# Patient Record
Sex: FEMALE | Race: WHITE | NOT HISPANIC OR LATINO | Employment: UNEMPLOYED | URBAN - METROPOLITAN AREA
[De-identification: names, ages, dates, MRNs, and addresses within clinical notes are randomized per-mention and may not be internally consistent; named-entity substitution may affect disease eponyms.]

---
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
## 2022-07-17 ENCOUNTER — OFFICE VISIT (OUTPATIENT)
Dept: URGENT CARE | Facility: URGENT CARE | Age: 22
End: 2022-07-17
Payer: COMMERCIAL

## 2022-07-17 VITALS
SYSTOLIC BLOOD PRESSURE: 110 MMHG | OXYGEN SATURATION: 99 % | TEMPERATURE: 99.2 F | DIASTOLIC BLOOD PRESSURE: 70 MMHG | RESPIRATION RATE: 16 BRPM | BODY MASS INDEX: 20.16 KG/M2 | WEIGHT: 128.7 LBS | HEART RATE: 118 BPM

## 2022-07-17 DIAGNOSIS — R42 DIZZINESS: Primary | ICD-10-CM

## 2022-07-17 DIAGNOSIS — D72.829 LEUKOCYTOSIS, UNSPECIFIED TYPE: ICD-10-CM

## 2022-07-17 LAB
BASOPHILS # BLD AUTO: 0 10E3/UL (ref 0–0.2)
BASOPHILS NFR BLD AUTO: 0 %
EOSINOPHIL # BLD AUTO: 0 10E3/UL (ref 0–0.7)
EOSINOPHIL NFR BLD AUTO: 0 %
ERYTHROCYTE [DISTWIDTH] IN BLOOD BY AUTOMATED COUNT: 14.3 % (ref 10–15)
HCT VFR BLD AUTO: 41.4 % (ref 35–47)
HGB BLD-MCNC: 13.3 G/DL (ref 11.7–15.7)
LYMPHOCYTES # BLD AUTO: 1.2 10E3/UL (ref 0.8–5.3)
LYMPHOCYTES NFR BLD AUTO: 7 %
MCH RBC QN AUTO: 28.7 PG (ref 26.5–33)
MCHC RBC AUTO-ENTMCNC: 32.1 G/DL (ref 31.5–36.5)
MCV RBC AUTO: 89 FL (ref 78–100)
MONOCYTES # BLD AUTO: 1.1 10E3/UL (ref 0–1.3)
MONOCYTES NFR BLD AUTO: 6 %
NEUTROPHILS # BLD AUTO: 15 10E3/UL (ref 1.6–8.3)
NEUTROPHILS NFR BLD AUTO: 87 %
PLATELET # BLD AUTO: 251 10E3/UL (ref 150–450)
RBC # BLD AUTO: 4.63 10E6/UL (ref 3.8–5.2)
WBC # BLD AUTO: 17.3 10E3/UL (ref 4–11)

## 2022-07-17 PROCEDURE — 80050 GENERAL HEALTH PANEL: CPT | Performed by: FAMILY MEDICINE

## 2022-07-17 PROCEDURE — 93000 ELECTROCARDIOGRAM COMPLETE: CPT | Performed by: FAMILY MEDICINE

## 2022-07-17 PROCEDURE — 36415 COLL VENOUS BLD VENIPUNCTURE: CPT | Performed by: FAMILY MEDICINE

## 2022-07-17 PROCEDURE — 99203 OFFICE O/P NEW LOW 30 MIN: CPT | Performed by: FAMILY MEDICINE

## 2022-07-17 NOTE — PROGRESS NOTES
SUBJECTIVE:  Chief Complaint   Patient presents with     Consult     Spells of feeling like fainting last 2 weeks      Mali Schumacher is a 22 year old female who presents with a chief complaint of fainting X 2 weeks.    May have been present for the past 8 months, will last 1-2 hours where she get lightheaded.  Will try to drink water, has to slowed down and sit down, try to take deep breaths, will try to lay down if at home.  This will occur 1-2 times a week.  Thinks that passed once once.    Here today as over the past 2-3 weeks has been getting worse, occurring daily.  No diarrhea.  Endorsed nausea, will occur more with dizziness.  No recent URI symptoms or fever.    Works at [employer], walks all day and gets sweaty, used to work 40 hours but this is on weekends now.  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    On OCP, this is a new one since January but does not think this is the cause.    Past Medical History:   Diagnosis Date     Anxiety      Depressive disorder      NO ACTIVE PROBLEMS      Substance abuse (H)      Current Outpatient Medications   Medication Sig Dispense Refill     potassium chloride ER (K-TAB) 20 MEQ CR tablet Take 1 tablet (20 mEq) by mouth daily 5 tablet 0     Social History     Tobacco Use     Smoking status: Current Every Day Smoker     Smokeless tobacco: Never Used     Tobacco comment: pt reports using nicotine vape throughout day daily   Substance Use Topics     Alcohol use: Yes     Alcohol/week: 0.0 standard drinks     Comment: reports drinking heavily nightly for the last two weeks, prior only on the weekends       ROS:  Review of systems negative except as stated above.    EXAM:   /70 (BP Location: Right arm, Patient Position: Chair, Cuff Size: Adult Regular)   Pulse 118   Temp 99.2  F (37.3  C) (Oral)   Resp 16   Wt 58.4 kg (128 lb 11.2 oz)   SpO2 99%   Breastfeeding No   BMI 20.16 kg/m     GENERAL APPEARANCE: healthy, alert and no distress  CHEST: clear to auscultation  CV: regular rate and rhythm  EXTREMITIES: peripheral pulses normal  PSYCH:alert, affect bright    EKG - NSR, rate 90, no PVCs, no ST c/w ischemia    Results for orders placed or performed in visit on 07/17/22   CBC with platelets and differential     Status: Abnormal   Result Value Ref Range    WBC Count 17.3 (H) 4.0 - 11.0 10e3/uL    RBC Count 4.63 3.80 - 5.20 10e6/uL    Hemoglobin 13.3 11.7 - 15.7 g/dL    Hematocrit 41.4 35.0 - 47.0 %    MCV 89 78 - 100 fL    MCH 28.7 26.5 - 33.0 pg    MCHC 32.1 31.5 - 36.5 g/dL    RDW 14.3 10.0 - 15.0 %    Platelet Count 251 150 - 450 10e3/uL    % Neutrophils 87 %    % Lymphocytes 7 %    % Monocytes 6 %    % Eosinophils 0 %    % Basophils 0 %    Absolute Neutrophils 15.0 (H) 1.6 - 8.3 10e3/uL    Absolute Lymphocytes 1.2 0.8 - 5.3 10e3/uL    Absolute Monocytes 1.1 0.0 - 1.3 10e3/uL    Absolute Eosinophils 0.0 0.0 - 0.7 10e3/uL    Absolute Basophils 0.0 0.0 - 0.2 10e3/uL   CBC with platelets and differential     Status: Abnormal    Narrative    The following orders were created for panel order CBC with platelets and differential.  Procedure                               Abnormality         Status                     ---------                               -----------         ------                     CBC with platelets and d...[[id number]]  Abnormal            Final result                 Please view results for these tests on the individual orders.       ASSESSMENT/PLAN:  (R42) Dizziness  (primary encounter diagnosis)  Comment: prolong symptoms  Plan: CBC with platelets and differential,         Comprehensive metabolic panel (BMP + Alb, Alk         Phos, ALT, AST, Total. Bili, TP), EKG 12-lead         complete w/read - Clinics, TSH with free T4         reflex            (D72.829) Leukocytosis, unspecified type  Plan: follow up with primary provider      Reviewed that further evaluation and work up  for her dizziness/lightheaded symptoms will need to be thru primary provider.  Encourage rest, plenty of fluids.  Reassurance given that EKG is normal though may need to obtain heart monitor to see if any correlation with symptoms.  Notable elevated WBC count, does not appear to have any easily identifable infectious source, will have patient follow up with primary provider for recheck.    Work excuse note given  Follow up with primary provider within 1 week    Hilton Malin MD  July 17, 2022 3:07 PM

## 2022-07-17 NOTE — LETTER
July 17, 2022      Mali Schumacher  [address] [address] [address]        To Whom It May Concern:    Mali Schumacher  was seen on 7/17.  Please excuse her from work 7/18-7/19 due to illness.        Sincerely,        Hilton Malin MD

## 2022-07-18 LAB
ALBUMIN SERPL-MCNC: 4.6 G/DL (ref 3.4–5)
ALP SERPL-CCNC: 80 U/L (ref 40–150)
ALT SERPL W P-5'-P-CCNC: 64 U/L (ref 0–50)
ANION GAP SERPL CALCULATED.3IONS-SCNC: 9 MMOL/L (ref 3–14)
AST SERPL W P-5'-P-CCNC: 46 U/L (ref 0–45)
BILIRUB SERPL-MCNC: 1 MG/DL (ref 0.2–1.3)
BUN SERPL-MCNC: 8 MG/DL (ref 7–30)
CALCIUM SERPL-MCNC: 9.5 MG/DL (ref 8.5–10.1)
CHLORIDE BLD-SCNC: 102 MMOL/L (ref 94–109)
CO2 SERPL-SCNC: 23 MMOL/L (ref 20–32)
CREAT SERPL-MCNC: 0.86 MG/DL (ref 0.52–1.04)
GFR SERPL CREATININE-BSD FRML MDRD: >90 ML/MIN/1.73M2
GLUCOSE BLD-MCNC: 100 MG/DL (ref 70–99)
POTASSIUM BLD-SCNC: 4 MMOL/L (ref 3.4–5.3)
PROT SERPL-MCNC: 8 G/DL (ref 6.8–8.8)
SODIUM SERPL-SCNC: 134 MMOL/L (ref 133–144)
TSH SERPL DL<=0.005 MIU/L-ACNC: 0.98 MU/L (ref 0.4–4)

## 2022-08-05 ENCOUNTER — TELEPHONE (OUTPATIENT)
Dept: PSYCHIATRY | Facility: CLINIC | Age: 22
End: 2022-08-05

## 2022-09-19 ENCOUNTER — OFFICE VISIT (OUTPATIENT)
Dept: URGENT CARE | Facility: URGENT CARE | Age: 22
End: 2022-09-19
Payer: COMMERCIAL

## 2022-09-19 VITALS
DIASTOLIC BLOOD PRESSURE: 72 MMHG | WEIGHT: 128 LBS | RESPIRATION RATE: 16 BRPM | TEMPERATURE: 98.4 F | OXYGEN SATURATION: 97 % | HEART RATE: 98 BPM | BODY MASS INDEX: 20.05 KG/M2 | SYSTOLIC BLOOD PRESSURE: 120 MMHG

## 2022-09-19 DIAGNOSIS — J03.90 ACUTE TONSILLITIS, UNSPECIFIED ETIOLOGY: Primary | ICD-10-CM

## 2022-09-19 LAB — DEPRECATED S PYO AG THROAT QL EIA: NEGATIVE

## 2022-09-19 PROCEDURE — 99213 OFFICE O/P EST LOW 20 MIN: CPT | Performed by: PHYSICIAN ASSISTANT

## 2022-09-19 PROCEDURE — 87651 STREP A DNA AMP PROBE: CPT | Performed by: PHYSICIAN ASSISTANT

## 2022-09-19 NOTE — PROGRESS NOTES
URGENT CARE VISIT:    SUBJECTIVE:   Mali Schumacher is a 22 year old female presenting with a chief complaint of sore throat and fatigue.  Onset was today.  She denies the following symptoms: stuffy nose, cough - non-productive, vomiting and diarrhea  Course of illness is same.    Treatment measures tried include Tylenol/Ibuprofen with no relief of symptoms.  Predisposing factors include None.    PMH:   Past Medical History:   Diagnosis Date     Anxiety      Depressive disorder      NO ACTIVE PROBLEMS      Substance abuse (H)      Allergies: No known allergies   Medications:   Current Outpatient Medications   Medication Sig Dispense Refill     amoxicillin-clavulanate (AUGMENTIN) 875-125 MG tablet Take 1 tablet by mouth 2 times daily for 7 days 14 tablet 0     potassium chloride ER (K-TAB) 20 MEQ CR tablet Take 1 tablet (20 mEq) by mouth daily 5 tablet 0     Social History:   Social History     Tobacco Use     Smoking status: Current Every Day Smoker     Smokeless tobacco: Never Used     Tobacco comment: pt reports using nicotine vape throughout day daily   Substance Use Topics     Alcohol use: Yes     Alcohol/week: 0.0 standard drinks     Comment: reports drinking heavily nightly for the last two weeks, prior only on the weekends       ROS:  Review of systems negative except as stated above.    OBJECTIVE:  /72   Pulse 98   Temp 98.4  F (36.9  C) (Oral)   Resp 16   Wt 58.1 kg (128 lb)   SpO2 97%   BMI 20.05 kg/m    GENERAL APPEARANCE: healthy, alert and no distress  EYES: EOMI,  PERRL, conjunctiva clear  HENT: ear canals and TM's normal.  Right tonsil 2+ with exudates and erythema. Normal left tonsil. NECK: supple, nontender, no lymphadenopathy  RESP: lungs clear to auscultation - no rales, rhonchi or wheezes  CV: regular rates and rhythm, normal S1 S2, no murmur noted  SKIN: no suspicious lesions or rashes    Labs:    Results for orders placed or performed in visit on 09/19/22   Streptococcus A Rapid  Screen w/Reflex to PCR - Clinic Collect     Status: Normal    Specimen: Throat; Swab   Result Value Ref Range    Group A Strep antigen Negative Negative       ASSESSMENT:    ICD-10-CM    1. Acute tonsillitis, unspecified etiology  J03.90 Streptococcus A Rapid Screen w/Reflex to PCR - Clinic Collect     Group A Streptococcus PCR Throat Swab     amoxicillin-clavulanate (AUGMENTIN) 875-125 MG tablet       PLAN:  Patient Instructions   Patient was educated on the natural course of bacterial tonsillitis. Left tonsil is markedly bigger than right. She has no uvula deviation or difficulty swallowing. Will treat with Augmentin.Take medications as prescribed. Side effects discussed. Conservative measures discussed including warm fluids, salt water gargles, Lozenges (Cepacol), and over-the-counter analgesics (Tylenol or Ibuprofen). To prevent spread avoid sharing utensils or glasses until she has completed 24 hours of antibiotic treatment.  See your primary care provider if symptoms worsen or do not improve in 5 days. Seek emergency care if you develop severe throat pain, or difficulty swallowing.    Patient verbalized understanding and is agreeable to plan. The patient was discharged ambulatory and in stable condition.    Jennifer Conklin PA-C ....................  9/19/2022   7:31 PM

## 2022-09-20 LAB — GROUP A STREP BY PCR: NOT DETECTED

## 2022-09-20 NOTE — PATIENT INSTRUCTIONS
Patient was educated on the natural course of bacterial tonsillitis. Take medications as prescribed. Side effects discussed. Conservative measures discussed including warm fluids, salt water gargles, Lozenges (Cepacol), and over-the-counter analgesics (Tylenol or Ibuprofen). To prevent spread avoid sharing utensils or glasses until she has completed 24 hours of antibiotic treatment.  See your primary care provider if symptoms worsen or do not improve in 5 days. Seek emergency care if you develop severe throat pain, or difficulty swallowing.

## 2022-12-07 ENCOUNTER — OFFICE VISIT (OUTPATIENT)
Dept: URGENT CARE | Facility: URGENT CARE | Age: 22
End: 2022-12-07
Payer: COMMERCIAL

## 2022-12-07 VITALS
DIASTOLIC BLOOD PRESSURE: 76 MMHG | RESPIRATION RATE: 18 BRPM | TEMPERATURE: 97.8 F | SYSTOLIC BLOOD PRESSURE: 114 MMHG | HEART RATE: 120 BPM | OXYGEN SATURATION: 99 %

## 2022-12-07 DIAGNOSIS — R11.0 NAUSEA: ICD-10-CM

## 2022-12-07 DIAGNOSIS — R05.1 ACUTE COUGH: ICD-10-CM

## 2022-12-07 DIAGNOSIS — R52 BODY ACHES: Primary | ICD-10-CM

## 2022-12-07 LAB
FLUAV AG SPEC QL IA: NEGATIVE
FLUBV AG SPEC QL IA: NEGATIVE

## 2022-12-07 PROCEDURE — 87804 INFLUENZA ASSAY W/OPTIC: CPT | Performed by: FAMILY MEDICINE

## 2022-12-07 PROCEDURE — 99213 OFFICE O/P EST LOW 20 MIN: CPT | Performed by: FAMILY MEDICINE

## 2022-12-07 RX ORDER — BENZONATATE 100 MG/1
100 CAPSULE ORAL 3 TIMES DAILY PRN
Qty: 30 CAPSULE | Refills: 0 | Status: SHIPPED | OUTPATIENT
Start: 2022-12-07 | End: 2022-12-12

## 2022-12-07 RX ORDER — ONDANSETRON 4 MG/1
4-8 TABLET, ORALLY DISINTEGRATING ORAL EVERY 8 HOURS PRN
Qty: 10 TABLET | Refills: 0 | Status: SHIPPED | OUTPATIENT
Start: 2022-12-07 | End: 2022-12-10

## 2022-12-07 NOTE — LETTER
[facility]  [address] [address]  [phone]      December 7, 2022    RE:  Mali Schumacher                                                                                                                                  To whom it may concern:    Mali Schumacher was seen in our urgent care today.  Please excuse her absence from work on Wednesday, December 7 and Thursday, December 8.      Sincerely,        Jo-Ann Maldonado MD    [facility]

## 2022-12-07 NOTE — PROGRESS NOTES
ICD-10-CM    1. Body aches  R52 Influenza A/B antigen      2. Nausea  R11.0 ondansetron (ZOFRAN ODT) 4 MG ODT tab      3. Acute cough  R05.1 benzonatate (TESSALON) 100 MG capsule        Influenza rapid testing negative today.  Likely viral etiology.      PLAN:  Patient Instructions   Use Zofran 1-2 tablets every 8 hours as needed for nausea.    Use benzonatate 1-2 pills every 8 hours as needed for coughing.    Lots of fluids and lots of reset.    If not back to baseline in 2 weeks, see your primary care doctor.    SUBJECTIVE:  Mali Schumacher is a 22 year old female who presents to  today with body aches, nausea and cough.  Has been sick on and off or about 2 months now.  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  Worst symptom today is nausea.  Has been fatigued as well.  Lots of sick kids at work.    OBJECTIVE:  /76 (BP Location: Right arm, Patient Position: Sitting, Cuff Size: Adult Regular)   Pulse 120   Temp 97.8  F (36.6  C) (Tympanic)   Resp 18   SpO2 99%   GEN: appears tired, in NAD  EYES: mild bilateral conjunctival injection  ENT: TMs normal, oral MMM, normal pharynx  Neck: no LAD  Lungs:  CTAB  CV:  Tachycardia resolved by the time of my exam, RRR    Results for orders placed or performed in visit on 12/07/22   Influenza A/B antigen     Status: Normal    Specimen: Nose; Swab   Result Value Ref Range    Influenza A antigen Negative Negative    Influenza B antigen Negative Negative    Narrative    Test results must be correlated with clinical data. If necessary, results should be confirmed by a molecular assay or viral culture.

## 2022-12-07 NOTE — PATIENT INSTRUCTIONS
Use Zofran 1-2 tablets every 8 hours as needed for nausea.    Use benzonatate 1-2 pills every 8 hours as needed for coughing.    Lots of fluids and lots of reset.    If not back to baseline in 2 weeks, see your primary care doctor.

## 2023-01-17 ENCOUNTER — TELEPHONE (OUTPATIENT)
Dept: BEHAVIORAL HEALTH | Facility: CLINIC | Age: 23
End: 2023-01-17

## 2023-01-17 ENCOUNTER — OFFICE VISIT (OUTPATIENT)
Dept: URGENT CARE | Facility: URGENT CARE | Age: 23
End: 2023-01-17
Payer: COMMERCIAL

## 2023-01-17 ENCOUNTER — HOSPITAL ENCOUNTER (EMERGENCY)
Facility: CLINIC | Age: 23
Discharge: OTHER INSTITUTION | End: 2023-01-18
Attending: EMERGENCY MEDICINE | Admitting: EMERGENCY MEDICINE
Payer: COMMERCIAL

## 2023-01-17 ENCOUNTER — HOSPITAL ENCOUNTER (EMERGENCY)
Facility: CLINIC | Age: 23
Discharge: HOME | End: 2023-01-17
Payer: COMMERCIAL

## 2023-01-17 VITALS — TEMPERATURE: 97.7 F | OXYGEN SATURATION: 100 % | HEART RATE: 121 BPM | RESPIRATION RATE: 22 BRPM

## 2023-01-17 DIAGNOSIS — R45.851 SUICIDAL IDEATION: ICD-10-CM

## 2023-01-17 DIAGNOSIS — F32.A DEPRESSION, UNSPECIFIED DEPRESSION TYPE: Primary | ICD-10-CM

## 2023-01-17 DIAGNOSIS — F41.9 ANXIETY: ICD-10-CM

## 2023-01-17 DIAGNOSIS — R45.86 EMOTIONAL LABILITY: ICD-10-CM

## 2023-01-17 PROBLEM — Z86.19 HISTORY OF COLD SORES: Status: ACTIVE | Noted: 2020-01-06

## 2023-01-17 PROBLEM — R46.89 COGNITIVE AND BEHAVIORAL CHANGES: Status: ACTIVE | Noted: 2019-03-18

## 2023-01-17 PROBLEM — F90.2 ADHD (ATTENTION DEFICIT HYPERACTIVITY DISORDER), COMBINED TYPE: Status: ACTIVE | Noted: 2020-01-06

## 2023-01-17 PROBLEM — R41.89 COGNITIVE AND BEHAVIORAL CHANGES: Status: ACTIVE | Noted: 2019-03-18

## 2023-01-17 PROBLEM — R63.0 ANOREXIA: Status: ACTIVE | Noted: 2018-05-16

## 2023-01-17 PROBLEM — F41.8 MIXED ANXIETY DEPRESSIVE DISORDER: Status: ACTIVE | Noted: 2017-05-11

## 2023-01-17 LAB
ALBUMIN SERPL BCG-MCNC: 4.8 G/DL (ref 3.5–5.2)
ALBUMIN UR-MCNC: 100 MG/DL
ALP SERPL-CCNC: 88 U/L (ref 35–104)
ALT SERPL W P-5'-P-CCNC: 24 U/L (ref 10–35)
AMPHETAMINES UR QL SCN: ABNORMAL
ANION GAP SERPL CALCULATED.3IONS-SCNC: 16 MMOL/L (ref 7–15)
APPEARANCE UR: CLEAR
AST SERPL W P-5'-P-CCNC: 25 U/L (ref 10–35)
BACTERIA #/AREA URNS HPF: ABNORMAL /HPF
BARBITURATES UR QL SCN: ABNORMAL
BASOPHILS # BLD AUTO: 0 10E3/UL (ref 0–0.2)
BASOPHILS NFR BLD AUTO: 0 %
BENZODIAZ UR QL SCN: ABNORMAL
BILIRUB SERPL-MCNC: 1.6 MG/DL
BILIRUB UR QL STRIP: NEGATIVE
BUN SERPL-MCNC: 6.7 MG/DL (ref 6–20)
BZE UR QL SCN: ABNORMAL
CALCIUM SERPL-MCNC: 9.6 MG/DL (ref 8.6–10)
CANNABINOIDS UR QL SCN: ABNORMAL
CHLORIDE SERPL-SCNC: 105 MMOL/L (ref 98–107)
COLOR UR AUTO: YELLOW
CREAT SERPL-MCNC: 0.82 MG/DL (ref 0.51–0.95)
DEPRECATED HCO3 PLAS-SCNC: 19 MMOL/L (ref 22–29)
EOSINOPHIL # BLD AUTO: 0 10E3/UL (ref 0–0.7)
EOSINOPHIL NFR BLD AUTO: 0 %
ERYTHROCYTE [DISTWIDTH] IN BLOOD BY AUTOMATED COUNT: 13.1 % (ref 10–15)
ETHANOL SERPL-MCNC: <0.01 G/DL
GFR SERPL CREATININE-BSD FRML MDRD: >90 ML/MIN/1.73M2
GLUCOSE SERPL-MCNC: 119 MG/DL (ref 70–99)
GLUCOSE UR STRIP-MCNC: NEGATIVE MG/DL
HCG UR QL: NEGATIVE
HCT VFR BLD AUTO: 39.1 % (ref 35–47)
HGB BLD-MCNC: 13.1 G/DL (ref 11.7–15.7)
HGB UR QL STRIP: NEGATIVE
IMM GRANULOCYTES # BLD: 0 10E3/UL
IMM GRANULOCYTES NFR BLD: 0 %
KETONES UR STRIP-MCNC: NEGATIVE MG/DL
LEUKOCYTE ESTERASE UR QL STRIP: NEGATIVE
LYMPHOCYTES # BLD AUTO: 1.8 10E3/UL (ref 0.8–5.3)
LYMPHOCYTES NFR BLD AUTO: 20 %
MCH RBC QN AUTO: 30.5 PG (ref 26.5–33)
MCHC RBC AUTO-ENTMCNC: 33.5 G/DL (ref 31.5–36.5)
MCV RBC AUTO: 91 FL (ref 78–100)
MONOCYTES # BLD AUTO: 0.4 10E3/UL (ref 0–1.3)
MONOCYTES NFR BLD AUTO: 5 %
MUCOUS THREADS #/AREA URNS LPF: PRESENT /LPF
NEUTROPHILS # BLD AUTO: 6.6 10E3/UL (ref 1.6–8.3)
NEUTROPHILS NFR BLD AUTO: 75 %
NITRATE UR QL: NEGATIVE
NRBC # BLD AUTO: 0 10E3/UL
NRBC BLD AUTO-RTO: 0 /100
OPIATES UR QL SCN: ABNORMAL
PH UR STRIP: 7.5 [PH] (ref 5–7)
PLATELET # BLD AUTO: 276 10E3/UL (ref 150–450)
POTASSIUM SERPL-SCNC: 3.5 MMOL/L (ref 3.4–5.3)
PROT SERPL-MCNC: 7.6 G/DL (ref 6.4–8.3)
RBC # BLD AUTO: 4.29 10E6/UL (ref 3.8–5.2)
RBC URINE: 1 /HPF
SODIUM SERPL-SCNC: 140 MMOL/L (ref 136–145)
SP GR UR STRIP: 1.02 (ref 1–1.03)
SQUAMOUS EPITHELIAL: 9 /HPF
UROBILINOGEN UR STRIP-MCNC: NORMAL MG/DL
WBC # BLD AUTO: 8.9 10E3/UL (ref 4–11)
WBC URINE: 5 /HPF

## 2023-01-17 PROCEDURE — 250N000013 HC RX MED GY IP 250 OP 250 PS 637: Performed by: EMERGENCY MEDICINE

## 2023-01-17 PROCEDURE — 81025 URINE PREGNANCY TEST: CPT | Performed by: EMERGENCY MEDICINE

## 2023-01-17 PROCEDURE — 36415 COLL VENOUS BLD VENIPUNCTURE: CPT | Performed by: EMERGENCY MEDICINE

## 2023-01-17 PROCEDURE — 258N000003 HC RX IP 258 OP 636: Performed by: EMERGENCY MEDICINE

## 2023-01-17 PROCEDURE — 82077 ASSAY SPEC XCP UR&BREATH IA: CPT | Performed by: EMERGENCY MEDICINE

## 2023-01-17 PROCEDURE — 99285 EMERGENCY DEPT VISIT HI MDM: CPT | Mod: 25

## 2023-01-17 PROCEDURE — 80307 DRUG TEST PRSMV CHEM ANLYZR: CPT | Performed by: EMERGENCY MEDICINE

## 2023-01-17 PROCEDURE — 90791 PSYCH DIAGNOSTIC EVALUATION: CPT

## 2023-01-17 PROCEDURE — C9803 HOPD COVID-19 SPEC COLLECT: HCPCS

## 2023-01-17 PROCEDURE — 81001 URINALYSIS AUTO W/SCOPE: CPT | Performed by: EMERGENCY MEDICINE

## 2023-01-17 PROCEDURE — 80053 COMPREHEN METABOLIC PANEL: CPT | Performed by: EMERGENCY MEDICINE

## 2023-01-17 PROCEDURE — 99215 OFFICE O/P EST HI 40 MIN: CPT | Performed by: PHYSICIAN ASSISTANT

## 2023-01-17 PROCEDURE — 85004 AUTOMATED DIFF WBC COUNT: CPT | Performed by: EMERGENCY MEDICINE

## 2023-01-17 RX ORDER — AZELAIC ACID 0.15 G/G
GEL TOPICAL
COMMUNITY
Start: 2022-10-20

## 2023-01-17 RX ORDER — SPIRONOLACTONE 50 MG/1
1 TABLET, FILM COATED ORAL
COMMUNITY
Start: 2022-10-14

## 2023-01-17 RX ORDER — HYDROXYZINE HYDROCHLORIDE 25 MG/1
TABLET, FILM COATED ORAL
COMMUNITY
Start: 2021-04-29 | End: 2023-01-17

## 2023-01-17 RX ORDER — LORAZEPAM 1 MG/1
1 TABLET ORAL ONCE
Status: COMPLETED | OUTPATIENT
Start: 2023-01-17 | End: 2023-01-17

## 2023-01-17 RX ORDER — ARIPIPRAZOLE 5 MG/1
5 TABLET ORAL EVERY MORNING
COMMUNITY
Start: 2022-09-23 | End: 2023-01-17

## 2023-01-17 RX ORDER — LAMOTRIGINE 200 MG/1
1 TABLET ORAL
COMMUNITY
Start: 2022-10-06

## 2023-01-17 RX ORDER — SODIUM CHLORIDE 9 MG/ML
INJECTION, SOLUTION INTRAVENOUS CONTINUOUS
Status: DISCONTINUED | OUTPATIENT
Start: 2023-01-17 | End: 2023-01-18 | Stop reason: HOSPADM

## 2023-01-17 RX ORDER — DEXTROAMPHETAMINE SACCHARATE, AMPHETAMINE ASPARTATE MONOHYDRATE, DEXTROAMPHETAMINE SULFATE AND AMPHETAMINE SULFATE 7.5; 7.5; 7.5; 7.5 MG/1; MG/1; MG/1; MG/1
1 CAPSULE, EXTENDED RELEASE ORAL DAILY
COMMUNITY
Start: 2021-07-27 | End: 2023-01-17

## 2023-01-17 RX ORDER — OLANZAPINE 10 MG/1
10 TABLET ORAL
Status: ON HOLD | COMMUNITY
Start: 2022-10-06 | End: 2023-01-23

## 2023-01-17 RX ORDER — PROPRANOLOL HYDROCHLORIDE 20 MG/1
20 TABLET ORAL 2 TIMES DAILY PRN
COMMUNITY
Start: 2022-10-06

## 2023-01-17 RX ORDER — METOCLOPRAMIDE 5 MG/1
5 TABLET ORAL 2 TIMES DAILY PRN
COMMUNITY
Start: 2022-12-11

## 2023-01-17 RX ORDER — QUETIAPINE FUMARATE 25 MG/1
1 TABLET, FILM COATED ORAL AT BEDTIME
COMMUNITY
Start: 2022-10-06

## 2023-01-17 RX ORDER — FLUOXETINE 10 MG/1
CAPSULE ORAL
COMMUNITY
Start: 2022-04-20 | End: 2023-01-17

## 2023-01-17 RX ORDER — LORAZEPAM 0.5 MG/1
TABLET ORAL
COMMUNITY
Start: 2022-11-21

## 2023-01-17 RX ORDER — DEXTROAMPHETAMINE SACCHARATE, AMPHETAMINE ASPARTATE, DEXTROAMPHETAMINE SULFATE AND AMPHETAMINE SULFATE 5; 5; 5; 5 MG/1; MG/1; MG/1; MG/1
20 TABLET ORAL 2 TIMES DAILY
COMMUNITY

## 2023-01-17 RX ORDER — MECLIZINE HCL 12.5 MG 12.5 MG/1
12.5 TABLET ORAL 3 TIMES DAILY PRN
COMMUNITY
Start: 2022-07-19

## 2023-01-17 RX ADMIN — LORAZEPAM 1 MG: 1 TABLET ORAL at 18:00

## 2023-01-17 RX ADMIN — SODIUM CHLORIDE 1000 ML: 9 INJECTION, SOLUTION INTRAVENOUS at 18:00

## 2023-01-17 ASSESSMENT — COLUMBIA-SUICIDE SEVERITY RATING SCALE - C-SSRS
6. HAVE YOU EVER DONE ANYTHING, STARTED TO DO ANYTHING, OR PREPARED TO DO ANYTHING TO END YOUR LIFE?: NO
3. HAVE YOU BEEN THINKING ABOUT HOW YOU MIGHT KILL YOURSELF?: YES
ATTEMPT LIFETIME: NO
2. HAVE YOU ACTUALLY HAD ANY THOUGHTS OF KILLING YOURSELF?: YES
REASONS FOR IDEATION LIFETIME: MOSTLY TO END OR STOP THE PAIN (YOU COULDN'T GO ON LIVING WITH THE PAIN OR HOW YOU WERE FEELING)
REASONS FOR IDEATION PAST MONTH: MOSTLY TO END OR STOP THE PAIN (YOU COULDN'T GO ON LIVING WITH THE PAIN OR HOW YOU WERE FEELING)
4. HAVE YOU HAD THESE THOUGHTS AND HAD SOME INTENTION OF ACTING ON THEM?: YES
5. HAVE YOU STARTED TO WORK OUT OR WORKED OUT THE DETAILS OF HOW TO KILL YOURSELF? DO YOU INTEND TO CARRY OUT THIS PLAN?: NO
2. HAVE YOU ACTUALLY HAD ANY THOUGHTS OF KILLING YOURSELF?: NO PLAN, NO INTENT
2. HAVE YOU ACTUALLY HAD ANY THOUGHTS OF KILLING YOURSELF?: NO PLAN, NO INTENT
1. IN THE PAST MONTH, HAVE YOU WISHED YOU WERE DEAD OR WISHED YOU COULD GO TO SLEEP AND NOT WAKE UP?: YES
1. IN YOUR LIFETIME, HAVE YOU WISHED YOU WERE DEAD OR WISHED YOU COULD GO TO SLEEP AND NOT WAKE UP?: PASSIVE SI
2. HAVE YOU ACTUALLY HAD ANY THOUGHTS OF KILLING YOURSELF?: YES
TOTAL  NUMBER OF INTERRUPTED ATTEMPTS LIFETIME: NO
1. HAVE YOU WISHED YOU WERE DEAD OR WISHED YOU COULD GO TO SLEEP AND NOT WAKE UP?: YES
TOTAL  NUMBER OF ABORTED OR SELF INTERRUPTED ATTEMPTS LIFETIME: NO
1. IN THE PAST MONTH, HAVE YOU WISHED YOU WERE DEAD OR WISHED YOU COULD GO TO SLEEP AND NOT WAKE UP?: PASSIVE SI
5. HAVE YOU STARTED TO WORK OUT OR WORKED OUT THE DETAILS OF HOW TO KILL YOURSELF? DO YOU INTEND TO CARRY OUT THIS PLAN?: NO
4. HAVE YOU HAD THESE THOUGHTS AND HAD SOME INTENTION OF ACTING ON THEM?: YES

## 2023-01-17 ASSESSMENT — ACTIVITIES OF DAILY LIVING (ADL)
ADLS_ACUITY_SCORE: 33
ADLS_ACUITY_SCORE: 35

## 2023-01-17 NOTE — PROGRESS NOTES
"  Assessment/Plan:    Pt is very upset and anxious, tearful, with passive suicidal thoughts. She has tried her home medications including lorazepam without relief of her symptoms & anxiety. I expressed my concern for her safety with her suicidal thoughts, and recommended we have her seek care at the ER for consideration of inpatient stay to address her mental health. She is in agreement but is hesitant to go to the ER because she feels the environment of the ER will worsen her symptoms. I informed her of EMPATH unit at [facility] and advised she go to the ER there. Pt agrees to go here, expressed concern about driving there due to her mental state. I recommended we contact someone to come give her a ride, or call EMS for transport. She declines this and states her boyfriend lives nearby and she will go to his house first, and have him drive her to the ER at [facility].     See patient instructions below.    At the end of the encounter, I discussed results, diagnosis, medications. Discussed red flags for immediate return to clinic/ER, as well as indications for follow up if no improvement. Patient understood and agreed to plan. Patient was stable for discharge.      ICD-10-CM    1. Depression, unspecified depression type  F32.A       2. Anxiety  F41.9             Return for Go to [facility] now.    ENEDELIA Mahoney, NICKY  [facility] [place]  -----------------------------------------------------------------------------------------------------------------------------------------------------    HPI:  Mali Schumacher is a 23 year old female with hx of depression & anxiety who presents for evaluation of the following:    For the past week has felt like her whole body is tense. She states that if she does something she has to \"concentrate to breathe\" and that she has to make a conscious effort to get up and move her body. If she extends her arms fully, " "she feels tingling in her arms. She notes neck pain down her entire spine and that if she turns her head she feels a \"crunch\" in the front of her neck at times. She feels she is having a \"mental health crisis and can't imagine going another few hours like this\". She has had thoughts of being better off dead but no active thoughts of hurting herself or formulating a plan.    She states when she has anxiety she usually experiences racing thoughts and palpitations, but she is not experiencing those now.    She lives with her mother who she does not get along with and her life has been more stressful lately she reports. She is on lamotrigine and olanzapine, as well as propranolol and lorazepam as needed for acute anxiety symptoms. She states she took lorazepam once in the past week, has also tried propranolol and hydroxyzine (which she had at home b/c she used to take this) and nothing is helping. She is also out of her lorazepam and has been trying to get a refill from her PCP. She also admits to trying EtOH to help with her anxiety which usually helps, but it hasn't helped in the last week. She denies other drug use.    Past Medical History:   Diagnosis Date     Anxiety      Depressive disorder      NO ACTIVE PROBLEMS      Substance abuse (H)        Vitals:    01/17/23 1441   Pulse: (!) 121   Resp: 22   Temp: 97.7  F (36.5  C)   TempSrc: Tympanic   SpO2: 100%       Physical Exam  Vitals and nursing note reviewed.   Cardiovascular:      Rate and Rhythm: Tachycardia present.   Pulmonary:      Effort: Pulmonary effort is normal.   Neurological:      Mental Status: She is alert.   Psychiatric:         Mood and Affect: Mood is anxious. Affect is labile and tearful.         Speech: Speech normal.         Behavior: Behavior normal.       Labs/Imaging:  No results found for this or any previous visit (from the past 24 hour(s)).  No results found for this or any previous visit (from the past 24 hour(s)).        There are no " Patient Instructions on file for this visit.

## 2023-01-17 NOTE — ED TRIAGE NOTES
Pt arrives via EMS from [facility] parking ramp. Pt originally checked in to [facility] for increasing SI over the past week but became increasingly anxious due to long wait time. EMS reports that the pt did leave to go to her car and called 911. Upon EMS arrival the pt did voluntarily come to ER for evaluation.     EMS reports the pt reporting to EMS that she has been drinking about 4-5 alcohol drinks per day and taking more adderral than prescribed to help her with worsening SI.  En-route EMS reports administering 4 mg Risperidone PO as the pt was very anxious. VSS.     Triage Assessment     Row Name 01/17/23 1707       Triage Assessment (Adult)    Airway WDL WDL       Respiratory WDL    Respiratory WDL WDL       Skin Circulation/Temperature WDL    Skin Circulation/Temperature WDL WDL       Cardiac WDL    Cardiac WDL WDL       Peripheral/Neurovascular WDL    Peripheral Neurovascular WDL WDL       Cognitive/Neuro/Behavioral WDL    Cognitive/Neuro/Behavioral WDL WDL

## 2023-01-17 NOTE — ED PROVIDER NOTES
"  History     Chief Complaint:  Suicidal     The history is provided by the patient and medical records (& RN).      Mali Schumacher is a 23 year old female with history of substance use disorder, anxiety, depression, cyclothymia, and ADHD who presents with suicidal ideation.    RN states patient arrives via EMS from [facility] ER parking ramp for worsening suicidal ideation over the past week and increased anxiety. There was reportedly a long wait time (about 3 hours) at [facility]. Per triage note, EMS states patient left the hospital to go to her car to call 911. Patient states during interview she first went to [facility] at 1430 and was recommended to go to [facility] for [facility]. Upon EMS arrival, the patient did voluntarily agree to come to the ER for evaluation. RN notes patient has a several medications in her purse and patient said she drinks 4 alcoholic beverages a day and more Adderall to cope with suicidal ideation and anxiety.     Patient states she has had suicidal ideation \"her whole life,\" but it's been worsening this week. She states she feels \"so confused and detached from reality.\" She states she feels like \"she's going crazy.\" She reports that last week whenever she had an anxiety attack she felt like she couldn't breathe, talk, and had \"dots in her eyes.\" Patient notes she's been taking more Adderall than prescribed because the dosage was recently decreased by her provider after she told the provider she didn't need a high dosage, but patient now feels she needs to go back to the original dose. When asked about drug use, patient suddenly screamed and started repeatedly saying she wants to go home. She was apparently concerned about her car, but would not give more information about that. She kept stating she shouldn't have come here and \"they're gonna hurt me.\" She states she needs to go to her safe place after this which is reportedly her boyfriend's home. Patient " "suddenly then calmed and down and said \"I feel fine I was being dramatic and doing this for attention. I'm sorry I wasted everyone's time.\" Patient then got emotional and tearful and states she feels like she wants to punch the wall and that she \"feels scared. I don't want to feel angry.\"     Patient reports she's been eating and drinking normally, but notes she's hungry. She denies concern for pregnancy. She reports no self-injury.    Independent Historian: patient      Review of External Notes: Reviewed in epic.  Urgent care visit reviewed prior to arrival where she was encouraged to seek further assessment in an emergent setting for her suicidal thoughts    ROS:  Review of Systems   Psychiatric/Behavioral: Positive for behavioral problems, confusion and suicidal ideas. Negative for self-injury. The patient is nervous/anxious.    All other systems reviewed and are negative.      Allergies:  No Known Drug Allergies      Medications:    Adderall XR  Lamictal   Ativan  Caplyta  Reglan  Zyprexa  Inderal  Seroquel  Aldactone  Abilify  Nexplanon    Past Medical History:    Anxiety   Depression   Substance use disorder  ADHD  Anisometropia   Hypermetropia   Cold sores  Cyclothymia     Family History:    Father: ADHD, alcohol use disorder  Mother: anxiety, depression    Social History:  Presents to the ED alone via EMS  Lives with her mother and step-father   PCP: [facility]      Physical Exam     Patient Vitals for the past 24 hrs:   BP Temp Temp src Pulse Resp SpO2   01/17/23 1810 126/74 -- -- (!) 131 -- 100 %   01/17/23 1757 126/74 98.1  F (36.7  C) Oral (!) 126 20 100 %      Physical Exam  General: Adult female, teary and anxious appearing, sitting upright  Eyes: PERRL, Conjunctive within normal limits  ENT: Moist mucous membranes, oropharynx clear.   CV: Normal S1S2, no murmur, rub or gallop.  Tachycardic, regular  Resp: Clear to auscultation bilaterally, no wheezes, rales or rhonchi.  Tachypneic.  Normal work " of breathing.  GI: Abdomen is soft, nontender and nondistended. No palpable masses. No rebound or guarding.  MSK: No extremity edema.  Moves all extremities equally.  Skin: Warm and dry. No rashes or lesions or ecchymoses on visible skin.  Neuro: Alert and oriented. Responds appropriately to all questions and commands. No focal findings appreciated. Normal muscle tone.  Psych: Good eye contact.  Intermittently teary, then yelling, then again calm.    Emergency Department Course     Laboratory:  Labs Ordered and Resulted from Time of ED Arrival to Time of ED Departure   COMPREHENSIVE METABOLIC PANEL - Abnormal       Result Value    Sodium 140      Potassium 3.5      Chloride 105      Carbon Dioxide (CO2) 19 (*)     Anion Gap 16 (*)     Urea Nitrogen 6.7      Creatinine 0.82      Calcium 9.6      Glucose 119 (*)     Alkaline Phosphatase 88      AST 25      ALT 24      Protein Total 7.6      Albumin 4.8      Bilirubin Total 1.6 (*)     GFR Estimate >90     ROUTINE UA WITH MICROSCOPIC REFLEX TO CULTURE - Abnormal    Color Urine Yellow      Appearance Urine Clear      Glucose Urine Negative      Bilirubin Urine Negative      Ketones Urine Negative      Specific Gravity Urine 1.020      Blood Urine Negative      pH Urine 7.5 (*)     Protein Albumin Urine 100 (*)     Urobilinogen Urine Normal      Nitrite Urine Negative      Leukocyte Esterase Urine Negative      Bacteria Urine Few (*)     Mucus Urine Present (*)     RBC Urine 1      WBC Urine 5      Squamous Epithelials Urine 9 (*)    DRUG ABUSE SCREEN 1 URINE (ED) - Abnormal    Amphetamines Urine Screen Positive (*)     Barbituates Urine Screen Negative      Benzodiazepine Urine Screen Negative      Cannabinoids Urine Screen Positive (*)     Cocaine Urine Screen Negative      Opiates Urine Screen Negative     ETHYL ALCOHOL LEVEL - Normal    Alcohol ethyl <0.01     HCG QUALITATIVE URINE - Normal    hCG Urine Qualitative Negative     CBC WITH PLATELETS AND DIFFERENTIAL     WBC Count 8.9      RBC Count 4.29      Hemoglobin 13.1      Hematocrit 39.1      MCV 91      MCH 30.5      MCHC 33.5      RDW 13.1      Platelet Count 276      % Neutrophils 75      % Lymphocytes 20      % Monocytes 5      % Eosinophils 0      % Basophils 0      % Immature Granulocytes 0      NRBCs per 100 WBC 0      Absolute Neutrophils 6.6      Absolute Lymphocytes 1.8      Absolute Monocytes 0.4      Absolute Eosinophils 0.0      Absolute Basophils 0.0      Absolute Immature Granulocytes 0.0      Absolute NRBCs 0.0        Emergency Department Course & Assessments:     Interventions:  Medications   0.9% sodium chloride BOLUS (0 mLs Intravenous Stopped 1/17/23 1925)     Followed by   sodium chloride 0.9% infusion ( Intravenous Not Given 1/17/23 2036)   LORazepam (ATIVAN) tablet 1 mg (1 mg Oral Given 1/17/23 1800)        Consultations/Discussion of Management or Tests:  1727 Patient exited her room and told nearby staff she was having a panic attack.  1727 I obtained history and examined the patient as noted above.  1926 I spoke with DEC regarding the patient.   Reassessed the patient.  Sleeping at this time.     Social Determinants of Health affecting care:  Patient has multiple mental health issues.  Reports taking extra doses of Adderall and using alcohol to cope with her mental health issues which is likely worsening them.  She reports chronic suicidal thoughts but seems to demonstrate increasing intensity of these thoughts.  No current plan to harm her self, but since use may be exacerbating suicidal risk.    Disposition:  Care of the patient was transferred to my colleague Dr. Milligan pending psychiatric bed placement.     Impression & Plan      Medical Decision Making:  Mali Schumacher is a 23-year-old female with multiple mental health issues who presents emergency department without physical complaints but noting she is unable to cope with her anxiety and depression with increasing suicidal thoughts but no  plan.  Presentation her mental health issues complicated by increased use of Adderall and alcohol reported.  She is not acutely intoxicated.  EtOH is negative.  Screening labs did not show any worrisome concerns.  She reported no physical concerns and medically is cleared for further evaluation from the mental health aspect.  She is assessed by DEC who felt that she met criteria for inpatient psychiatric care.  She is currently on JULIAN.  She was sleeping on my reassessment, not requiring any medications for agitation after receiving Ativan orally on my initial assessment to help calm her anxiety.  Her care is transferred to Dr. Milligan in stable condition.    Diagnosis:    ICD-10-CM    1. Suicidal ideation  R45.851       2. Emotional lability  R45.86            Scribe Disclosure:  I, Nelida Sanchesdav, am serving as a scribe at 5:27 PM on 1/17/2023 to document services personally performed by Negar Dumont MD based on my observations and the provider's statements to me.    1/17/2023   Negar Dumont MD Jonkman, Tracy Dianne, MD  01/18/23 0141

## 2023-01-18 ENCOUNTER — HOSPITAL ENCOUNTER (INPATIENT)
Facility: CLINIC | Age: 23
LOS: 6 days | Discharge: HOME | End: 2023-01-24
Attending: PSYCHIATRY & NEUROLOGY | Admitting: PSYCHIATRY & NEUROLOGY
Payer: COMMERCIAL

## 2023-01-18 VITALS
SYSTOLIC BLOOD PRESSURE: 124 MMHG | RESPIRATION RATE: 18 BRPM | OXYGEN SATURATION: 97 % | TEMPERATURE: 98.1 F | DIASTOLIC BLOOD PRESSURE: 83 MMHG | HEART RATE: 101 BPM

## 2023-01-18 DIAGNOSIS — F25.0 SCHIZOAFFECTIVE DISORDER, BIPOLAR TYPE (H): Primary | ICD-10-CM

## 2023-01-18 LAB — SARS-COV-2 RNA RESP QL NAA+PROBE: NEGATIVE

## 2023-01-18 PROCEDURE — 250N000013 HC RX MED GY IP 250 OP 250 PS 637: Performed by: EMERGENCY MEDICINE

## 2023-01-18 PROCEDURE — 124N000002 HC R&B MH UMMC

## 2023-01-18 PROCEDURE — 250N000013 HC RX MED GY IP 250 OP 250 PS 637: Performed by: PSYCHIATRY & NEUROLOGY

## 2023-01-18 PROCEDURE — U0003 INFECTIOUS AGENT DETECTION BY NUCLEIC ACID (DNA OR RNA); SEVERE ACUTE RESPIRATORY SYNDROME CORONAVIRUS 2 (SARS-COV-2) (CORONAVIRUS DISEASE [COVID-19]), AMPLIFIED PROBE TECHNIQUE, MAKING USE OF HIGH THROUGHPUT TECHNOLOGIES AS DESCRIBED BY CMS-2020-01-R: HCPCS | Performed by: EMERGENCY MEDICINE

## 2023-01-18 RX ORDER — SPIRONOLACTONE 25 MG/1
50 TABLET ORAL
Status: DISCONTINUED | OUTPATIENT
Start: 2023-01-18 | End: 2023-01-18 | Stop reason: HOSPADM

## 2023-01-18 RX ORDER — NICOTINE 21 MG/24HR
1 PATCH, TRANSDERMAL 24 HOURS TRANSDERMAL DAILY
Status: DISCONTINUED | OUTPATIENT
Start: 2023-01-19 | End: 2023-01-24 | Stop reason: HOSPADM

## 2023-01-18 RX ORDER — HYDROXYZINE HYDROCHLORIDE 25 MG/1
25 TABLET, FILM COATED ORAL 4 TIMES DAILY PRN
Status: DISCONTINUED | OUTPATIENT
Start: 2023-01-18 | End: 2023-01-24 | Stop reason: HOSPADM

## 2023-01-18 RX ORDER — OLANZAPINE 5 MG/1
5 TABLET ORAL 3 TIMES DAILY PRN
Status: DISCONTINUED | OUTPATIENT
Start: 2023-01-18 | End: 2023-01-24 | Stop reason: HOSPADM

## 2023-01-18 RX ORDER — LAMOTRIGINE 200 MG/1
200 TABLET ORAL DAILY
Status: DISCONTINUED | OUTPATIENT
Start: 2023-01-18 | End: 2023-01-18 | Stop reason: HOSPADM

## 2023-01-18 RX ORDER — NICOTINE 21 MG/24HR
1 PATCH, TRANSDERMAL 24 HOURS TRANSDERMAL DAILY
Status: DISCONTINUED | OUTPATIENT
Start: 2023-01-18 | End: 2023-01-18 | Stop reason: ALTCHOICE

## 2023-01-18 RX ORDER — LORAZEPAM 0.5 MG/1
0.5 TABLET ORAL DAILY PRN
Status: DISCONTINUED | OUTPATIENT
Start: 2023-01-18 | End: 2023-01-19

## 2023-01-18 RX ORDER — METOCLOPRAMIDE 5 MG/1
5 TABLET ORAL 2 TIMES DAILY PRN
Status: DISCONTINUED | OUTPATIENT
Start: 2023-01-18 | End: 2023-01-24 | Stop reason: HOSPADM

## 2023-01-18 RX ORDER — LAMOTRIGINE 200 MG/1
200 TABLET ORAL DAILY
Status: DISCONTINUED | OUTPATIENT
Start: 2023-01-19 | End: 2023-01-24 | Stop reason: HOSPADM

## 2023-01-18 RX ORDER — TRAZODONE HYDROCHLORIDE 50 MG/1
50 TABLET, FILM COATED ORAL
Status: DISCONTINUED | OUTPATIENT
Start: 2023-01-18 | End: 2023-01-24 | Stop reason: HOSPADM

## 2023-01-18 RX ORDER — MECLIZINE HCL 12.5 MG 12.5 MG/1
12.5 TABLET ORAL 3 TIMES DAILY PRN
Status: DISCONTINUED | OUTPATIENT
Start: 2023-01-18 | End: 2023-01-24 | Stop reason: HOSPADM

## 2023-01-18 RX ORDER — SPIRONOLACTONE 50 MG/1
50 TABLET, FILM COATED ORAL
Status: DISCONTINUED | OUTPATIENT
Start: 2023-01-19 | End: 2023-01-24 | Stop reason: HOSPADM

## 2023-01-18 RX ORDER — METOCLOPRAMIDE 5 MG/1
5 TABLET ORAL 2 TIMES DAILY PRN
Status: DISCONTINUED | OUTPATIENT
Start: 2023-01-18 | End: 2023-01-18 | Stop reason: HOSPADM

## 2023-01-18 RX ORDER — POLYETHYLENE GLYCOL 3350 17 G
2 POWDER IN PACKET (EA) ORAL
Status: DISCONTINUED | OUTPATIENT
Start: 2023-01-18 | End: 2023-01-18 | Stop reason: HOSPADM

## 2023-01-18 RX ORDER — OLANZAPINE 10 MG/1
10 TABLET ORAL
Status: DISCONTINUED | OUTPATIENT
Start: 2023-01-18 | End: 2023-01-24 | Stop reason: HOSPADM

## 2023-01-18 RX ORDER — DEXTROAMPHETAMINE SACCHARATE, AMPHETAMINE ASPARTATE, DEXTROAMPHETAMINE SULFATE AND AMPHETAMINE SULFATE 2.5; 2.5; 2.5; 2.5 MG/1; MG/1; MG/1; MG/1
20 TABLET ORAL 2 TIMES DAILY
Status: DISCONTINUED | OUTPATIENT
Start: 2023-01-18 | End: 2023-01-18 | Stop reason: HOSPADM

## 2023-01-18 RX ORDER — ACETAMINOPHEN 325 MG/1
650 TABLET ORAL EVERY 4 HOURS PRN
Status: DISCONTINUED | OUTPATIENT
Start: 2023-01-18 | End: 2023-01-24 | Stop reason: HOSPADM

## 2023-01-18 RX ORDER — QUETIAPINE FUMARATE 25 MG/1
25 TABLET, FILM COATED ORAL AT BEDTIME
Status: DISCONTINUED | OUTPATIENT
Start: 2023-01-18 | End: 2023-01-18 | Stop reason: HOSPADM

## 2023-01-18 RX ORDER — PROPRANOLOL HYDROCHLORIDE 10 MG/1
20 TABLET ORAL 2 TIMES DAILY PRN
Status: DISCONTINUED | OUTPATIENT
Start: 2023-01-18 | End: 2023-01-24 | Stop reason: HOSPADM

## 2023-01-18 RX ORDER — QUETIAPINE FUMARATE 25 MG/1
25 TABLET, FILM COATED ORAL AT BEDTIME
Status: DISCONTINUED | OUTPATIENT
Start: 2023-01-18 | End: 2023-01-24 | Stop reason: HOSPADM

## 2023-01-18 RX ORDER — LORAZEPAM 0.5 MG/1
0.5 TABLET ORAL EVERY 4 HOURS PRN
Status: DISCONTINUED | OUTPATIENT
Start: 2023-01-18 | End: 2023-01-18 | Stop reason: HOSPADM

## 2023-01-18 RX ORDER — PROPRANOLOL HYDROCHLORIDE 20 MG/1
20 TABLET ORAL 2 TIMES DAILY PRN
Status: DISCONTINUED | OUTPATIENT
Start: 2023-01-18 | End: 2023-01-18 | Stop reason: HOSPADM

## 2023-01-18 RX ORDER — MECLIZINE HCL 12.5 MG 12.5 MG/1
12.5 TABLET ORAL 3 TIMES DAILY PRN
Status: DISCONTINUED | OUTPATIENT
Start: 2023-01-18 | End: 2023-01-18 | Stop reason: HOSPADM

## 2023-01-18 RX ORDER — POLYETHYLENE GLYCOL 3350 17 G
4 POWDER IN PACKET (EA) ORAL
Status: DISCONTINUED | OUTPATIENT
Start: 2023-01-18 | End: 2023-01-24 | Stop reason: HOSPADM

## 2023-01-18 RX ORDER — HYDROXYZINE HYDROCHLORIDE 25 MG/1
50 TABLET, FILM COATED ORAL EVERY 6 HOURS PRN
Status: DISCONTINUED | OUTPATIENT
Start: 2023-01-18 | End: 2023-01-24 | Stop reason: HOSPADM

## 2023-01-18 RX ORDER — HYDROXYZINE HYDROCHLORIDE 25 MG/1
25 TABLET, FILM COATED ORAL ONCE
Status: COMPLETED | OUTPATIENT
Start: 2023-01-18 | End: 2023-01-18

## 2023-01-18 RX ORDER — OLANZAPINE 10 MG/1
10 TABLET ORAL
Status: DISCONTINUED | OUTPATIENT
Start: 2023-01-18 | End: 2023-01-18 | Stop reason: HOSPADM

## 2023-01-18 RX ADMIN — NICOTINE POLACRILEX 2 MG: 2 LOZENGE ORAL at 16:49

## 2023-01-18 RX ADMIN — DEXTROAMPHETAMINE SACCHARATE, AMPHETAMINE ASPARTATE MONOHYDRATE, DEXTROAMPHETAMINE SULFATE, AND AMPHETAMINE SULFATE 20 MG: 2.5; 2.5; 2.5; 2.5 TABLET ORAL at 09:00

## 2023-01-18 RX ADMIN — QUETIAPINE FUMARATE 25 MG: 25 TABLET ORAL at 01:57

## 2023-01-18 RX ADMIN — NICOTINE POLACRILEX 2 MG: 2 LOZENGE ORAL at 15:32

## 2023-01-18 RX ADMIN — DEXTROAMPHETAMINE SACCHARATE, AMPHETAMINE ASPARTATE MONOHYDRATE, DEXTROAMPHETAMINE SULFATE, AND AMPHETAMINE SULFATE 20 MG: 2.5; 2.5; 2.5; 2.5 TABLET ORAL at 13:38

## 2023-01-18 RX ADMIN — LUMATEPERONE 42 MG: 42 CAPSULE ORAL at 01:57

## 2023-01-18 RX ADMIN — TRAZODONE HYDROCHLORIDE 50 MG: 50 TABLET ORAL at 22:19

## 2023-01-18 RX ADMIN — LORAZEPAM 0.5 MG: 0.5 TABLET ORAL at 11:07

## 2023-01-18 RX ADMIN — SPIRONOLACTONE 50 MG: 25 TABLET ORAL at 10:48

## 2023-01-18 RX ADMIN — LORAZEPAM 0.5 MG: 0.5 TABLET ORAL at 18:13

## 2023-01-18 RX ADMIN — LAMOTRIGINE 200 MG: 200 TABLET ORAL at 13:38

## 2023-01-18 RX ADMIN — NICOTINE 1 PATCH: 21 PATCH, EXTENDED RELEASE TRANSDERMAL at 13:28

## 2023-01-18 RX ADMIN — LUMATEPERONE 42 MG: 42 CAPSULE ORAL at 22:20

## 2023-01-18 RX ADMIN — HYDROXYZINE HYDROCHLORIDE 25 MG: 25 TABLET, FILM COATED ORAL at 13:28

## 2023-01-18 RX ADMIN — SPIRONOLACTONE 50 MG: 25 TABLET ORAL at 15:53

## 2023-01-18 RX ADMIN — QUETIAPINE 25 MG: 25 TABLET ORAL at 22:19

## 2023-01-18 ASSESSMENT — ACTIVITIES OF DAILY LIVING (ADL)
ADLS_ACUITY_SCORE: 35
HEARING_DIFFICULTY_OR_DEAF: NO
FALL_HISTORY_WITHIN_LAST_SIX_MONTHS: NO
ADLS_ACUITY_SCORE: 35
ADLS_ACUITY_SCORE: 35
TOILETING_ISSUES: NO
DRESSING/BATHING_DIFFICULTY: NO
WALKING_OR_CLIMBING_STAIRS_DIFFICULTY: NO
DIFFICULTY_COMMUNICATING: NO
CHANGE_IN_FUNCTIONAL_STATUS_SINCE_ONSET_OF_CURRENT_ILLNESS/INJURY: NO
CONCENTRATING,_REMEMBERING_OR_MAKING_DECISIONS_DIFFICULTY: NO
WEAR_GLASSES_OR_BLIND: NO
ADLS_ACUITY_SCORE: 35
ADLS_ACUITY_SCORE: 35
DIFFICULTY_EATING/SWALLOWING: NO
ADLS_ACUITY_SCORE: 35
ADLS_ACUITY_SCORE: 18
ADLS_ACUITY_SCORE: 35
ADLS_ACUITY_SCORE: 35
DOING_ERRANDS_INDEPENDENTLY_DIFFICULTY: NO
ADLS_ACUITY_SCORE: 35

## 2023-01-18 ASSESSMENT — ENCOUNTER SYMPTOMS
CONFUSION: 1
NERVOUS/ANXIOUS: 1

## 2023-01-18 NOTE — PHARMACY-ADMISSION MEDICATION HISTORY
Admission medication history interview status for this patient is complete. See [facility] admission navigator for allergy information, prior to admission medications and immunization status.     Medication history interview done, indicate source(s): Patient  Medication history resources (including written lists, pill bottles, clinic record):None  Pharmacy: [facility]    Changes made to PTA medication list:  Added: nexplanon  Changed: Adderall XR 30 mg --> IR 20 mg bid, added sig to meclizine and Reglan, olanzapine 10 mg at bedtime --> at bedtime prn, added sig to propranolol  Reported as Not Taking: none  Removed: none    Actions taken by pharmacist (provider contacted, etc):None     Additional medication history information: Pt states she takes spironolactone for acne and has not taken in about a week because she has not had any breakouts.    Medication reconciliation/reorder completed by provider prior to medication history?  N   (Y/N)       Prior to Admission medications    Medication Sig Last Dose Taking? Auth Provider Long Term End Date   amphetamine-dextroamphetamine (ADDERALL) 20 MG tablet Take 20 mg by mouth 2 times daily 1/17/2023 at x1 afternoon Yes Unknown, Entered By History     azelaic acid (FINACIA) 15 % external gel APPLY TOPICALLY TO FACE 1 TO 2 TIMES DAILY Past Week Yes Reported, Patient     lamoTRIgine (LAMICTAL) 200 MG tablet Take 1 tablet by mouth daily at 2 pm 1/16/2023 at 2pm Yes Reported, Patient Yes    LORazepam (ATIVAN) 0.5 MG tablet TAKE 1 TABLET BY MOUTH ONCE DAILY AS NEEDED FOR SEVERE ANXIETY. QTY:30 prn at prn Yes Reported, Patient     lumateperone (CAPLYTA) 42 MG capsule Take 1 capsule by mouth At Bedtime 1/16/2023 at hs Yes Reported, Patient     meclizine (ANTIVERT) 12.5 MG tablet Take 12.5 mg by mouth 3 times daily as needed for dizziness or nausea prn at prn Yes Reported, Patient     metoclopramide (REGLAN) 5 MG tablet Take 5 mg by mouth 2 times daily as needed prn at prn Yes Reported,  Patient     OLANZapine (ZYPREXA) 10 MG tablet Take 10 mg by mouth nightly as needed prn at prn Yes Reported, Patient Yes    propranolol (INDERAL) 20 MG tablet Take 20 mg by mouth 2 times daily as needed prn at prn Yes Reported, Patient Yes    QUEtiapine (SEROQUEL) 25 MG tablet Take 1 tablet by mouth At Bedtime 1/16/2023 at  Yes Reported, Patient Yes    spironolactone (ALDACTONE) 50 MG tablet Take 1 tablet by mouth 2 times daily Past Week Yes Reported, Patient Yes
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
## 2023-01-18 NOTE — ED NOTES
Patient asked RN if her mom could come visit her and bring some personal belongings including hygiene items, a blanket and pillow. RN told patient this would be allowed but belongings would be searched before giving to patient. Patient verbalized understanding. Patient's mom expected to visit at noon today.

## 2023-01-18 NOTE — ED NOTES
Per patient, her mom wants to speak with  when she comes to visit. RN notified Miracle, in-person DEC . Plan is for Miracle to speak with patient's mom when she comes to visit and to re-assess patient at that time.

## 2023-01-18 NOTE — TELEPHONE ENCOUNTER
0155 Bed Search Update:    [facility] [place]-Website updated 1/17 at 2347 to reflect there are no beds available.  [facility] [place] [place], [place], [facility]) Website updated 1/17 at 2114 to reflect there are no beds available  [facility]-0140 unit reporting they are at capacity.  Check back in AM.  [facility]-Website updated 1/18 at 0004 to reflect there are no beds available.    [facility]-Committed pts only.  Long wait list    Remains on wait list.
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
## 2023-01-18 NOTE — TELEPHONE ENCOUNTER
S: [facility] ED , DEC  Nathalia calling at 7:40 pm  about a 23 year old/Female presenting with SI and AH        B: Pt arrived via EMS. Presenting problem, stressors: Pt reports her primary stressor is family    Pt affect in ED: Labile and Tearful  Pt Dx: Schizoaffective Disorder  Previous IPMH hx? No    Pt endorses SI, no plan   Hx of suicide attempt? No  Pt denies SIB  Pt denies HI   Pt endorses auditory hallucinations .     Hx of aggression/violence, sexual offences, legal concerns, or Epic care plan? describe: no  Current concerns for aggression this visit? No  Does pt have a history of Civil Commitment? No  Is Pt their own guardian? Yes    Pt is prescribed medication. Is patient medication compliant? Yes  Pt endorses OP services: Psychiatrist and Therapist  CD concerns: None  Acute or chronic medical concerns: none  Does Pt present with specific needs, assistive devices, or exclusionary criteria? None      Pt is ambulatory  Pt is able to perform ADLs independently      A: Pt to be reviewed for [facility] admission. Pt is Voluntary  Preferred placement: [facility]    COVID:Not yet ordered, Intake to request lab   Utox: Positive for amphetamines and cannabis   CMP: Abnormalities: CO2 19, Amion 16, glucose 119  CBC: WNL  HCG: Negative    R: Patient cleared and ready for behavioral bed placement: Yes  Pt placed on IP worklist? Yes

## 2023-01-18 NOTE — ED NOTES
Patient's mom arrived to ED with patient's pillow and a large blue travel bag. Security searched blue bag which contains blanket, clothes, hygiene items, and a charging cord. Bag labeled with patient sticker and placed in locked office. Pillow given to patient. Patient appears happy to see her mom. Calm, cooperative. Patient verbalizes understanding that she can access items in her bag, but the bag will stay in the locked office for safety reasons.

## 2023-01-18 NOTE — ED NOTES
Pt anxious but easily redirectable. Pt changed into behavioral scrubs. Security present and searched pt. 2 belonging bags placed in locked room.

## 2023-01-18 NOTE — TELEPHONE ENCOUNTER
R: The pt is currently in the [facility] ER awaiting placement. Patient is on a 72HH and intake to search the state of [place].    Intake morning Bed Search (Done at 11:56 AM)    [facility] is posting 0 beds.   [facility] is posting 0 beds.  [facility] is posting 0 beds.  [facility] is posting 0 beds.  [facility] is posting 0 beds.  [facility] is posting 0 beds.  [facility] is posting 0 beds.  [facility] is posting 0 beds. Low acuity.    [facility] is posting 0 bed. Mixed unit 12+. Low acuity only.   [facility] is posting 0 beds. No aggression.   [facility] is posting 0 beds.   [facility] is posting 1 bed. Low acuity only. 11:59 AM Intake called and spoke to Angelique Ibrahim at capacity.   [facility] is posting 0 beds. Low acuity only.   [facility] is posting 0 beds. 0 acute, 0 med psych, 0 mood disorder- very low acuity. 11:59 AM Intake called and spoke to Angelique Ibrahim at capacity.   [facility] is posting 5 beds. Low acuity. 12:04 PM Intake called and spoke to [place], bed available and patient can be reviewed.   [facility] is posting 1 bed. Low acuity only. 72 hr hold required.    [facility] is posting 3 beds. Vol only, No Hx of aggression, violence, or assault. No sexual offenders. No 72 hr holds. Patient is not appropriate for open bed: Patient on a 72HH  [facility] is posting 8 beds. (Must have the cognitive ability to do programming. No aggressive or violent behavior or recent HX in the last 2 yrs. MH must be primary).  [facility] [place] is posting 2 beds. Low acuity only. Violence and aggression capped.   [facility] is posting 0 beds. Low acuity, Neg Covid.   [facility] is posting 2 beds. Covid neg. Vol only. Combined adolescent and adult unit. No aggressive or violent behavior. No registered sex offenders. Patient is not appropriate for open bed: Patient on a 72HH.  [facility]  is posting 12 beds. No Covid needed. Call for details. Patient is not appropriate for open bed: Patient on a 72HH and must be placed in [place], [facility] is posting 1 bed. Mixed Unit (13+). Low acuity only. Patient is not appropriate for open bed: Not consenting to placement and transportation this far, facility requires that patient consents to transport home from facility.    [facility] and [place] location at capacity.     12:25 PM Intake faxed [place] referral form and facesheet to [facility]. Intake awaiting response.     2:31 PM Intake received call from [place] with [facility] that patient has been declined due to current unit mileu but [place] willing to re-review 1/19.    2:34 PM Intake paged Deb Naegele for presentation to shared female bed.     2:45 PM Intake received call from [facility] that patient needs further review for shared bed by charge RN. [facility] will call back again.    3:09 PM Intake received call from [facility] that patient has been accepted to station 3A for shared female bed.     3:50 PM Intake called Narinder caraballo RN on 4A with patient disposition. [facility] to call report.     3:54 PM Intake called [facility] and spoke to Aster with patient disposition and RN report number.. [facility] to call for report.
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
## 2023-01-18 NOTE — ED NOTES
"Pt provided with hand lotion, chapstick, and essential oils. Pt states \"this is making me happy.\" Breakfast tray removed from room. Pt denies further needs at this time.  "

## 2023-01-19 LAB
ALBUMIN SERPL-MCNC: 4.1 G/DL (ref 3.4–5)
ALP SERPL-CCNC: 76 U/L (ref 40–150)
ALT SERPL W P-5'-P-CCNC: 30 U/L (ref 0–50)
ANION GAP SERPL CALCULATED.3IONS-SCNC: 3 MMOL/L (ref 3–14)
AST SERPL W P-5'-P-CCNC: 21 U/L (ref 0–45)
BASOPHILS # BLD AUTO: 0 10E3/UL (ref 0–0.2)
BASOPHILS NFR BLD AUTO: 0 %
BILIRUB SERPL-MCNC: 1.2 MG/DL (ref 0.2–1.3)
BUN SERPL-MCNC: 7 MG/DL (ref 7–30)
CALCIUM SERPL-MCNC: 9.3 MG/DL (ref 8.5–10.1)
CHLORIDE BLD-SCNC: 108 MMOL/L (ref 94–109)
CHOLEST SERPL-MCNC: 157 MG/DL
CO2 SERPL-SCNC: 29 MMOL/L (ref 20–32)
CREAT SERPL-MCNC: 1.06 MG/DL (ref 0.52–1.04)
EOSINOPHIL # BLD AUTO: 0 10E3/UL (ref 0–0.7)
EOSINOPHIL NFR BLD AUTO: 0 %
ERYTHROCYTE [DISTWIDTH] IN BLOOD BY AUTOMATED COUNT: 13.2 % (ref 10–15)
GFR SERPL CREATININE-BSD FRML MDRD: 76 ML/MIN/1.73M2
GLUCOSE BLD-MCNC: 110 MG/DL (ref 70–99)
HCT VFR BLD AUTO: 41.4 % (ref 35–47)
HDLC SERPL-MCNC: 75 MG/DL
HGB BLD-MCNC: 13.6 G/DL (ref 11.7–15.7)
IMM GRANULOCYTES # BLD: 0 10E3/UL
IMM GRANULOCYTES NFR BLD: 0 %
LDLC SERPL CALC-MCNC: 60 MG/DL
LYMPHOCYTES # BLD AUTO: 2.4 10E3/UL (ref 0.8–5.3)
LYMPHOCYTES NFR BLD AUTO: 33 %
MCH RBC QN AUTO: 30.8 PG (ref 26.5–33)
MCHC RBC AUTO-ENTMCNC: 32.9 G/DL (ref 31.5–36.5)
MCV RBC AUTO: 94 FL (ref 78–100)
MONOCYTES # BLD AUTO: 0.4 10E3/UL (ref 0–1.3)
MONOCYTES NFR BLD AUTO: 6 %
NEUTROPHILS # BLD AUTO: 4.3 10E3/UL (ref 1.6–8.3)
NEUTROPHILS NFR BLD AUTO: 61 %
NONHDLC SERPL-MCNC: 82 MG/DL
NRBC # BLD AUTO: 0 10E3/UL
NRBC BLD AUTO-RTO: 0 /100
PLATELET # BLD AUTO: 271 10E3/UL (ref 150–450)
POTASSIUM BLD-SCNC: 4.1 MMOL/L (ref 3.4–5.3)
PROT SERPL-MCNC: 7.8 G/DL (ref 6.8–8.8)
RBC # BLD AUTO: 4.42 10E6/UL (ref 3.8–5.2)
SODIUM SERPL-SCNC: 140 MMOL/L (ref 133–144)
TRIGL SERPL-MCNC: 112 MG/DL
TSH SERPL DL<=0.005 MIU/L-ACNC: 1.05 MU/L (ref 0.4–4)
WBC # BLD AUTO: 7.1 10E3/UL (ref 4–11)

## 2023-01-19 PROCEDURE — 250N000013 HC RX MED GY IP 250 OP 250 PS 637: Performed by: PSYCHIATRY & NEUROLOGY

## 2023-01-19 PROCEDURE — 124N000002 HC R&B MH UMMC

## 2023-01-19 PROCEDURE — 250N000013 HC RX MED GY IP 250 OP 250 PS 637: Performed by: CLINICAL NURSE SPECIALIST

## 2023-01-19 PROCEDURE — 36415 COLL VENOUS BLD VENIPUNCTURE: CPT | Performed by: PSYCHIATRY & NEUROLOGY

## 2023-01-19 PROCEDURE — 84443 ASSAY THYROID STIM HORMONE: CPT | Performed by: PSYCHIATRY & NEUROLOGY

## 2023-01-19 PROCEDURE — 99222 1ST HOSP IP/OBS MODERATE 55: CPT | Mod: AI | Performed by: CLINICAL NURSE SPECIALIST

## 2023-01-19 PROCEDURE — 85004 AUTOMATED DIFF WBC COUNT: CPT | Performed by: PSYCHIATRY & NEUROLOGY

## 2023-01-19 PROCEDURE — 80061 LIPID PANEL: CPT | Performed by: PSYCHIATRY & NEUROLOGY

## 2023-01-19 PROCEDURE — 80053 COMPREHEN METABOLIC PANEL: CPT | Performed by: PSYCHIATRY & NEUROLOGY

## 2023-01-19 RX ORDER — DEXTROAMPHETAMINE SACCHARATE, AMPHETAMINE ASPARTATE, DEXTROAMPHETAMINE SULFATE AND AMPHETAMINE SULFATE 5; 5; 5; 5 MG/1; MG/1; MG/1; MG/1
20 TABLET ORAL 2 TIMES DAILY
Status: DISCONTINUED | OUTPATIENT
Start: 2023-01-20 | End: 2023-01-19

## 2023-01-19 RX ORDER — DEXTROAMPHETAMINE SACCHARATE, AMPHETAMINE ASPARTATE, DEXTROAMPHETAMINE SULFATE AND AMPHETAMINE SULFATE 5; 5; 5; 5 MG/1; MG/1; MG/1; MG/1
20 TABLET ORAL ONCE
Status: DISCONTINUED | OUTPATIENT
Start: 2023-01-19 | End: 2023-01-19

## 2023-01-19 RX ORDER — DEXTROAMPHETAMINE SACCHARATE, AMPHETAMINE ASPARTATE, DEXTROAMPHETAMINE SULFATE AND AMPHETAMINE SULFATE 5; 5; 5; 5 MG/1; MG/1; MG/1; MG/1
20 TABLET ORAL ONCE
Status: COMPLETED | OUTPATIENT
Start: 2023-01-19 | End: 2023-01-19

## 2023-01-19 RX ORDER — OLANZAPINE 10 MG/1
10 TABLET ORAL ONCE
Status: COMPLETED | OUTPATIENT
Start: 2023-01-19 | End: 2023-01-19

## 2023-01-19 RX ORDER — LORAZEPAM 0.5 MG/1
0.5 TABLET ORAL 2 TIMES DAILY PRN
Status: DISCONTINUED | OUTPATIENT
Start: 2023-01-19 | End: 2023-01-24 | Stop reason: HOSPADM

## 2023-01-19 RX ORDER — DEXTROAMPHETAMINE SACCHARATE, AMPHETAMINE ASPARTATE, DEXTROAMPHETAMINE SULFATE AND AMPHETAMINE SULFATE 5; 5; 5; 5 MG/1; MG/1; MG/1; MG/1
20 TABLET ORAL 2 TIMES DAILY
Status: DISCONTINUED | OUTPATIENT
Start: 2023-01-20 | End: 2023-01-24 | Stop reason: HOSPADM

## 2023-01-19 RX ADMIN — QUETIAPINE 25 MG: 25 TABLET ORAL at 21:32

## 2023-01-19 RX ADMIN — OLANZAPINE 10 MG: 10 TABLET, FILM COATED ORAL at 19:52

## 2023-01-19 RX ADMIN — SPIRONOLACTONE 50 MG: 50 TABLET, FILM COATED ORAL at 15:47

## 2023-01-19 RX ADMIN — NICOTINE 1 PATCH: 21 PATCH, EXTENDED RELEASE TRANSDERMAL at 09:09

## 2023-01-19 RX ADMIN — HYDROXYZINE HYDROCHLORIDE 50 MG: 25 TABLET, FILM COATED ORAL at 10:09

## 2023-01-19 RX ADMIN — LAMOTRIGINE 200 MG: 200 TABLET ORAL at 13:38

## 2023-01-19 RX ADMIN — SPIRONOLACTONE 50 MG: 50 TABLET, FILM COATED ORAL at 09:09

## 2023-01-19 RX ADMIN — DEXTROAMPHETAMINE SACCHARATE, AMPHETAMINE ASPARTATE, DEXTROAMPHETAMINE SULFATE AND AMPHETAMINE SULFATE 20 MG: 5; 5; 5; 5 TABLET ORAL at 15:47

## 2023-01-19 RX ADMIN — PROPRANOLOL HYDROCHLORIDE 20 MG: 10 TABLET ORAL at 16:01

## 2023-01-19 RX ADMIN — DEXTROAMPHETAMINE SACCHARATE, AMPHETAMINE ASPARTATE, DEXTROAMPHETAMINE SULFATE AND AMPHETAMINE SULFATE 20 MG: 5; 5; 5; 5 TABLET ORAL at 13:04

## 2023-01-19 RX ADMIN — OLANZAPINE 5 MG: 5 TABLET, FILM COATED ORAL at 17:47

## 2023-01-19 RX ADMIN — LORAZEPAM 0.5 MG: 0.5 TABLET ORAL at 14:20

## 2023-01-19 ASSESSMENT — ACTIVITIES OF DAILY LIVING (ADL)
ADLS_ACUITY_SCORE: 18
HYGIENE/GROOMING: HANDWASHING;INDEPENDENT
ORAL_HYGIENE: INDEPENDENT
ADLS_ACUITY_SCORE: 18
HYGIENE/GROOMING: HANDWASHING
DRESS: SCRUBS (BEHAVIORAL HEALTH);INDEPENDENT
ADLS_ACUITY_SCORE: 18
LAUNDRY: WITH SUPERVISION
DRESS: INDEPENDENT
ADLS_ACUITY_SCORE: 18
ORAL_HYGIENE: INDEPENDENT
LAUNDRY: WITH SUPERVISION
ADLS_ACUITY_SCORE: 18
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
## 2023-01-19 NOTE — PROGRESS NOTES
Pt was glad to have received her Adderall, today. She said this really helps with her focus. Prn Vistaril helped some with her anxiety. She has been more in her rm today, vs in the milieu. She is pleasant, and appears calm. Pt is allowed to have her own pillow and blanket, per order.

## 2023-01-19 NOTE — PHARMACY-ADMISSION MEDICATION HISTORY
"Admission Medication History Completed by Pharmacy    Please refer to progress note on 1/17, \"Pharmacy-Admission Medication History\" under previous encounter [facility] Emergency Dept for information regarding prior to admission medications.    Natasha Cabrera, Pharm.D., [facility]  Behavioral Health Inpatient Pharmacist  [facility] ([facility])  Phone: *[phone] ([facility]) or [phone]      "
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
## 2023-01-19 NOTE — PROGRESS NOTES
01/18/23 2038   Patient Belongings   Did you bring any home meds/supplements to the hospital?  Yes   Patient Belongings locker;sent to security per site process   Patient Belongings Put in Hospital Secure Location (Security or Locker, etc.) clothing;other (see comments);purse/wallet;cell phone/electronics;medication(s);watch;tote;money (see comment)   Belongings Search Yes   Clothing Search Yes   Second Staff Rhonda RITTER     Security-   Medications    Locker-  Cell phone  Jacket  Purse  White pillow  Pink blanket  Headband  Makeup wipes  Lip balm  Book    Fabric spray  Lavender essential oil  Pens  Makeup  $1 bill  Grey uggs  Green sweats  Chisholm sweatshirt  Brisk  Planner  Jewelry  Socks  Chisholm sweats  Red sweatshirt  Underwear  Bras  tampons  Shampoo/conditioner/body wash  Tooth brush  Toothpaste  Skincare    A               Admission:  I am responsible for any personal items that are not sent to the safe or pharmacy.  [facility] is not responsible for loss, theft or damage of any property in my possession.    Signature:  _________________________________ Date: _______  Time: _____                                              Staff Signature:  ____________________________ Date: ________  Time: _____      2nd Staff person, if patient is unable/unwilling to sign:    Signature: ________________________________ Date: ________  Time: _____     Discharge:  [facility] has returned all of my personal belongings:    Signature: _________________________________ Date: ________  Time: _____                                          Staff Signature:  ____________________________ Date: ________  Time: _____

## 2023-01-19 NOTE — PLAN OF CARE
"Goal Outcome Evaluation:    Pt is calm and peasant. Her mood is \"pretty content.\" She endorses anxiety rated 7/10. Pt also said she has AH at times-not at present. The voices \"just say random, personal things.\" She said she will attend \"some groups,\" otherwise will keep mainly to self. Pt states trazodone helps her sleep much better. Her goal for the day is \"to ask for support when I need it, to excuse myself from grp if I get too anxious, and to read.\"                        "

## 2023-01-19 NOTE — PLAN OF CARE
01/19/23 1609   Group Therapy Session   Group Attendance attended group session   Time Session Began 1415   Time Session Ended 1505   Total Time (minutes) 30   Total # Attendees 6   Group Type life skill;psychotherapeutic   Group Topic Covered coping skills/lifestyle management;problem-solving;balanced lifestyle;structured socialization;emotions/expression;self-care activities   Patient Participation Detail Mental health management group designed to promote insight, self-reflection, self-esteem and goal-setting. Pt Response: Pt initially presented guarded, but progressively became more engaged. She was able to follow and complete the 2-part activity independently. Pt contributed to the discussion honestly and thoughtfully. Her goal for each day is to open up and connect with at least one person as this is something she struggles with currently. Pt presents with good self-awareness and insight.

## 2023-01-19 NOTE — ED NOTES
EMS here to bring pt to [place]. All of belongings given to EMS to transport- 2 pt bags and a large blue duffell bag. Cell phone placed in pt belonging bag that was charging at RN desk.
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
## 2023-01-19 NOTE — ED NOTES
Report called to 4A at [place]. They reported they are waiting for a bed to clear still. Will call back when bed is available so we can call transport.

## 2023-01-19 NOTE — PLAN OF CARE
01/19/23 1216   Individualization/Patient Specific Goals   Patient Personal Strengths family/social support;interests/hobbies;no history of violence;positive educational history;positive vocational history;stable living environment   Patient Vulnerabilities adverse childhood experience(s);traumatic event   Interprofessional Rounds   Summary Pt reports chronic relapsing schizoaffective disorder.  She is anxious about being able to get better.   Participants advanced practice nurse;CTC;nursing   Behavioral Team Discussion   Participants HE Marcus, EKTA Villanueva, Jenna Mejia RN   Progress Minimal, just admitted.   Anticipated length of stay 5-7 days   Continued Stay Criteria/Rationale Pt needs psychiatric stabilization.   Medical/Physical None   Plan Psychiatric evaluation and stabilization, medication management, milieu management, attend group therapy, CTC will provide discharge planning.

## 2023-01-20 PROCEDURE — 250N000013 HC RX MED GY IP 250 OP 250 PS 637: Performed by: CLINICAL NURSE SPECIALIST

## 2023-01-20 PROCEDURE — 124N000002 HC R&B MH UMMC

## 2023-01-20 PROCEDURE — 250N000013 HC RX MED GY IP 250 OP 250 PS 637: Performed by: PSYCHIATRY & NEUROLOGY

## 2023-01-20 RX ORDER — AZELAIC ACID 0.15 G/G
GEL TOPICAL 2 TIMES DAILY
Status: DISCONTINUED | OUTPATIENT
Start: 2023-01-20 | End: 2023-01-24 | Stop reason: HOSPADM

## 2023-01-20 RX ADMIN — LAMOTRIGINE 200 MG: 200 TABLET ORAL at 08:59

## 2023-01-20 RX ADMIN — DEXTROAMPHETAMINE SACCHARATE, AMPHETAMINE ASPARTATE, DEXTROAMPHETAMINE SULFATE AND AMPHETAMINE SULFATE 20 MG: 5; 5; 5; 5 TABLET ORAL at 13:58

## 2023-01-20 RX ADMIN — QUETIAPINE 25 MG: 25 TABLET ORAL at 21:26

## 2023-01-20 RX ADMIN — AZELAIC ACID: 0.15 GEL TOPICAL at 15:01

## 2023-01-20 RX ADMIN — NICOTINE 1 PATCH: 21 PATCH, EXTENDED RELEASE TRANSDERMAL at 08:59

## 2023-01-20 RX ADMIN — HYDROXYZINE HYDROCHLORIDE 50 MG: 25 TABLET, FILM COATED ORAL at 18:48

## 2023-01-20 RX ADMIN — AZELAIC ACID 50 G: 0.15 GEL TOPICAL at 20:45

## 2023-01-20 RX ADMIN — SPIRONOLACTONE 50 MG: 50 TABLET, FILM COATED ORAL at 16:39

## 2023-01-20 RX ADMIN — HYDROXYZINE HYDROCHLORIDE 50 MG: 25 TABLET, FILM COATED ORAL at 12:52

## 2023-01-20 RX ADMIN — SPIRONOLACTONE 50 MG: 50 TABLET, FILM COATED ORAL at 08:59

## 2023-01-20 RX ADMIN — OLANZAPINE 15 MG: 10 TABLET, FILM COATED ORAL at 21:26

## 2023-01-20 RX ADMIN — DEXTROAMPHETAMINE SACCHARATE, AMPHETAMINE ASPARTATE, DEXTROAMPHETAMINE SULFATE AND AMPHETAMINE SULFATE 20 MG: 5; 5; 5; 5 TABLET ORAL at 10:05

## 2023-01-20 RX ADMIN — LORAZEPAM 0.5 MG: 0.5 TABLET ORAL at 15:06

## 2023-01-20 ASSESSMENT — ACTIVITIES OF DAILY LIVING (ADL)
ORAL_HYGIENE: INDEPENDENT
ADLS_ACUITY_SCORE: 18
ADLS_ACUITY_SCORE: 18
HYGIENE/GROOMING: INDEPENDENT
ADLS_ACUITY_SCORE: 18
ADLS_ACUITY_SCORE: 18
DRESS: INDEPENDENT
ADLS_ACUITY_SCORE: 18

## 2023-01-20 NOTE — PLAN OF CARE
Assessment/Intervention/Current Symptoms and Care Coordination  - chart review  - team meeting  - team rounds/pt interview addressed patient needs/concerns  - Current Symptoms include the following: suicidal    Discharge Plan or Goal  Pending stabilization & development of a safe discharge plan.  Considerations include: Return home with outpatient providers      Barriers to Discharge   Patient requires further psychiatric stabilization due to current symptomology      Referral Status  Pt has providers in place      Legal Status  Patient is voluntary

## 2023-01-20 NOTE — PLAN OF CARE
"RN Assessment:    Pt presented with euthymic affect. Pt appeared calm and was cooperative while interacting with the writer. Pt was alert and oriented x 4. Pt denied having SI, HI, thoughts of SIB, and hallucinations. Pt denied having physical pain. Pt denied having new medical concerns. Pt endorsed not sleeping well last night due to waking multiple times. Pt feels the medications that are currently ordered are working well. When writer asked pt it pt could elaborate on that notion pt stated, \" I feel better and better everyday I'm here.\" No medication side effects endorsed by pt or observed by writer. Pt was isolative and withdrawn this shift. Continue to monitor for safety and changes in medical condition.     Pt showered this shift.       PRN Medications passed this shift:    1252: Hydroxyzine 50 mg PO for anxiety. This medication was effective per pt report.     1506: Ativan 0.5 mg PO for anxiety. This medication was passed by other RN.   "

## 2023-01-20 NOTE — PLAN OF CARE
Occupational Therapy       01/20/23 1500   Group Therapy Session   Group Attendance attended group session   Time Session Began 1015   Time Session Ended 1200   Total Time (minutes) 20   Total # Attendees 9   Group Type task skill   Group Topic Covered cognitive activities;coping skills/lifestyle management;leisure exploration/use of leisure time;problem-solving;structured socialization   Group Session Detail OT: Education on healthy activity engagement and creative hands on endeavor (OT clinic) to increase concentration, focus, attention to task/detail, decision making, problem solving, frustration tolerance, task follow through, coping with stress, healthy leisure engagement, creative expression, and social engagement   Patient Response/Contribution other (see comments)  (engaged; tearful)   Patient Participation Detail Pt arrived late to group and stayed for duration. Pt declined to engage in a task and elected to observe group. Pt sat with peer and engaged in reciprocal social interactions. Pt observed to be tearful at one point while talking with peer and accepted verbal support from peer. Pt engaged in intermittent social interactions with therapist and reported she may be interested in a creative hands on endeavor during the next clinic group.

## 2023-01-20 NOTE — PLAN OF CARE
Problem: Depressive Symptoms  Goal: Depressive Symptoms  Description: Signs and symptoms of listed problems will be absent or manageable.  Outcome: Adequate for Care Transition     Problem: Psychotic Signs/Symptoms  Goal: Improved Behavioral Control (Psychotic Signs/Symptoms)  Outcome: Adequate for Care Transition   Goal Outcome Evaluation:       Patient had flat affect. Mood was sad, depressed and anxious. Patient was isolative to room and did not socialize with peers. Was guarded and spoke sparingly during assessment. Patient complained of anxiety 8/10 and depression 7/10. Patient stated anxiety was very high and was hearing voices but she could not explain the content of the voices heard. Was encouraged to socialize with peers or engage in activities like drawing or coloring but patient declined. Patient was noted crying and attributed it to anxiety and hearing voices. Was given Propranolol 20 mg for anxiety, Zyprexa 5 mg for agitation, and scheduled medications. Denied pain, covid 19 symptoms, SI/HI/SIB/VH, and contracted for safety. Had good appetite. Intervention was helpful. Will continue to monitor patient.

## 2023-01-21 PROCEDURE — 250N000013 HC RX MED GY IP 250 OP 250 PS 637: Performed by: CLINICAL NURSE SPECIALIST

## 2023-01-21 PROCEDURE — 124N000002 HC R&B MH UMMC

## 2023-01-21 PROCEDURE — 250N000013 HC RX MED GY IP 250 OP 250 PS 637: Performed by: PSYCHIATRY & NEUROLOGY

## 2023-01-21 RX ADMIN — AZELAIC ACID: 0.15 GEL TOPICAL at 08:41

## 2023-01-21 RX ADMIN — QUETIAPINE 25 MG: 25 TABLET ORAL at 21:42

## 2023-01-21 RX ADMIN — SPIRONOLACTONE 50 MG: 50 TABLET, FILM COATED ORAL at 08:34

## 2023-01-21 RX ADMIN — LORAZEPAM 0.5 MG: 0.5 TABLET ORAL at 10:14

## 2023-01-21 RX ADMIN — DEXTROAMPHETAMINE SACCHARATE, AMPHETAMINE ASPARTATE, DEXTROAMPHETAMINE SULFATE AND AMPHETAMINE SULFATE 20 MG: 5; 5; 5; 5 TABLET ORAL at 10:13

## 2023-01-21 RX ADMIN — NICOTINE 1 PATCH: 21 PATCH, EXTENDED RELEASE TRANSDERMAL at 08:34

## 2023-01-21 RX ADMIN — AZELAIC ACID 50 G: 0.15 GEL TOPICAL at 21:43

## 2023-01-21 RX ADMIN — SPIRONOLACTONE 50 MG: 50 TABLET, FILM COATED ORAL at 15:46

## 2023-01-21 RX ADMIN — OLANZAPINE 10 MG: 10 TABLET, FILM COATED ORAL at 17:08

## 2023-01-21 RX ADMIN — LAMOTRIGINE 200 MG: 200 TABLET ORAL at 08:34

## 2023-01-21 RX ADMIN — DEXTROAMPHETAMINE SACCHARATE, AMPHETAMINE ASPARTATE, DEXTROAMPHETAMINE SULFATE AND AMPHETAMINE SULFATE 20 MG: 5; 5; 5; 5 TABLET ORAL at 13:34

## 2023-01-21 RX ADMIN — OLANZAPINE 15 MG: 10 TABLET, FILM COATED ORAL at 21:42

## 2023-01-21 RX ADMIN — HYDROXYZINE HYDROCHLORIDE 50 MG: 25 TABLET, FILM COATED ORAL at 15:46

## 2023-01-21 ASSESSMENT — ACTIVITIES OF DAILY LIVING (ADL)
ADLS_ACUITY_SCORE: 18
DRESS: INDEPENDENT
ADLS_ACUITY_SCORE: 18
ADLS_ACUITY_SCORE: 18
HYGIENE/GROOMING: INDEPENDENT
ADLS_ACUITY_SCORE: 18
ORAL_HYGIENE: INDEPENDENT
ADLS_ACUITY_SCORE: 18
ADLS_ACUITY_SCORE: 18
LAUNDRY: WITH SUPERVISION
ADLS_ACUITY_SCORE: 18

## 2023-01-21 NOTE — PLAN OF CARE
Problem: Psychotic Signs/Symptoms  Goal: Optimal Cognitive Function (Psychotic Signs/Symptoms)  Outcome: Adequate for Care Transition   Goal Outcome Evaluation:       Patient had flat affect. Mood was flat, calm, and depressed. Patient verbalized feeling better today. Was isolative to room and had minimal socialization with peers. Patient after lunch, spent time in the lunch, sitting by self watching TV. Patient was pleasant and stated that staff have been helpful to her and she is getting better. Denied pain, anxiety, depression, covid 19 symptoms, SI/HI/SIB/AH/VH, and contracted for safety. Was medication compliant and had good appetite. Will continue to monitor patient.

## 2023-01-21 NOTE — PLAN OF CARE
"  Problem: Depressive Symptoms  Goal: Social and Therapeutic (Depression)  Description: Signs and symptoms of listed problems will be absent or manageable.  Outcome: Progressing   Goal Outcome Evaluation:    Plan of Care Reviewed With: patient      Patient stated: \"I feel better today\". Denied suicidal thoughts or wishing to be dead. Rated depression at 2/10, anxiety at 8/10. Denied HI, hallucinations, or delusions. Patient was given PRN for the elevated anxiety.     Behavior was controlled, patient followed directions and communicated needs well. Spent the evening out on the unit, participated in offered groups, watched TV and socialized with other patients.     Mood was calm. Affect was blunted. Memory is intact. patient kept self clean and well groomed.     Goal for the shift: to participate in groups.   Concerns: none verbalized this shift. No questions for staff.    Patient denied pain and all other physical issues.     Took medications as prescribed. Denied side effects, none assessed by staff at this time.     PRNs:Hydroxyzine 50 mg.   "

## 2023-01-22 PROCEDURE — 124N000002 HC R&B MH UMMC

## 2023-01-22 PROCEDURE — 250N000013 HC RX MED GY IP 250 OP 250 PS 637: Performed by: PSYCHIATRY & NEUROLOGY

## 2023-01-22 PROCEDURE — 250N000013 HC RX MED GY IP 250 OP 250 PS 637: Performed by: CLINICAL NURSE SPECIALIST

## 2023-01-22 RX ADMIN — OLANZAPINE 5 MG: 5 TABLET, FILM COATED ORAL at 17:28

## 2023-01-22 RX ADMIN — MECLIZINE HCL 12.5 MG 12.5 MG: 12.5 TABLET ORAL at 10:05

## 2023-01-22 RX ADMIN — SPIRONOLACTONE 50 MG: 50 TABLET, FILM COATED ORAL at 16:12

## 2023-01-22 RX ADMIN — OLANZAPINE 15 MG: 10 TABLET, FILM COATED ORAL at 20:34

## 2023-01-22 RX ADMIN — HYDROXYZINE HYDROCHLORIDE 25 MG: 25 TABLET, FILM COATED ORAL at 13:01

## 2023-01-22 RX ADMIN — SPIRONOLACTONE 50 MG: 50 TABLET, FILM COATED ORAL at 08:37

## 2023-01-22 RX ADMIN — AZELAIC ACID: 0.15 GEL TOPICAL at 08:42

## 2023-01-22 RX ADMIN — NICOTINE 1 PATCH: 21 PATCH, EXTENDED RELEASE TRANSDERMAL at 08:37

## 2023-01-22 RX ADMIN — DEXTROAMPHETAMINE SACCHARATE, AMPHETAMINE ASPARTATE, DEXTROAMPHETAMINE SULFATE AND AMPHETAMINE SULFATE 20 MG: 5; 5; 5; 5 TABLET ORAL at 13:02

## 2023-01-22 RX ADMIN — QUETIAPINE 25 MG: 25 TABLET ORAL at 20:34

## 2023-01-22 RX ADMIN — DEXTROAMPHETAMINE SACCHARATE, AMPHETAMINE ASPARTATE, DEXTROAMPHETAMINE SULFATE AND AMPHETAMINE SULFATE 20 MG: 5; 5; 5; 5 TABLET ORAL at 09:58

## 2023-01-22 RX ADMIN — AZELAIC ACID: 0.15 GEL TOPICAL at 20:35

## 2023-01-22 RX ADMIN — LAMOTRIGINE 200 MG: 200 TABLET ORAL at 08:37

## 2023-01-22 ASSESSMENT — ACTIVITIES OF DAILY LIVING (ADL)
ADLS_ACUITY_SCORE: 18
ORAL_HYGIENE: INDEPENDENT
ADLS_ACUITY_SCORE: 18
LAUNDRY: WITH SUPERVISION
ADLS_ACUITY_SCORE: 18
HYGIENE/GROOMING: INDEPENDENT
DRESS: INDEPENDENT
ADLS_ACUITY_SCORE: 18

## 2023-01-22 NOTE — PROGRESS NOTES
1300; PRN Hydroxyzine administered per pt's request for anxiety rated 5/10. Will continue to monitor.

## 2023-01-22 NOTE — PLAN OF CARE
"  Problem: Psychotic Signs/Symptoms  Goal: Increased Participation and Engagement (Psychotic Signs/Symptoms)  Outcome: Progressing   Goal Outcome Evaluation:    Plan of Care Reviewed With: patient        Patient stated: \"I feel so anxious today\". Denied suicidal thoughts or wishing to be dead. Rated depression at 2/10, anxiety at 9/10. Denied HI, hallucinations, or delusions. Patient was given PRN for the elevated anxiety. Patient was tearfully. Patients stats her norther visited today and  She feels that the visit was very triggering for her. Patient was given a PRN.       Spent the evening isolating to her room. Patient ate about 75% of her dinner plate.   Mood was anxious . Affect was blunted. Memory is intact. patient kept self clean and well groomed.      Goal for the shift: to participate in groups.   Concerns: none verbalized this shift. No questions for staff.     Patient denied pain and all other physical issues.      Took medications as prescribed. Denied side effects, none assessed by staff at this time.      PRNs:Hydroxyzine 50 mg.             Zyprexa 10 mg.   "

## 2023-01-22 NOTE — PLAN OF CARE
Problem: Psychotic Signs/Symptoms  Goal: Increased Participation and Engagement (Psychotic Signs/Symptoms)  Outcome: Adequate for Care Transition   Goal Outcome Evaluation:    Patient had flat affect. Mood was calm, anxious, and depressed. Was isolative to room resting and had no socialization with peers. Patient stated she was not feeling good today. Complained of nausea. Denied pain, anxiety, depression, covid 19 symptoms, SI/HI/SIB/AH/VH, and contracted for safety. Was given Meclizine 12.5 mg for nausea, Hydroxyzine 25 mg for anxiety, and scheduled medications. Had appetite. Will continue to monitor patient.

## 2023-01-23 PROCEDURE — 99232 SBSQ HOSP IP/OBS MODERATE 35: CPT | Performed by: CLINICAL NURSE SPECIALIST

## 2023-01-23 PROCEDURE — 250N000013 HC RX MED GY IP 250 OP 250 PS 637: Performed by: CLINICAL NURSE SPECIALIST

## 2023-01-23 PROCEDURE — G0177 OPPS/PHP; TRAIN & EDUC SERV: HCPCS

## 2023-01-23 PROCEDURE — 90853 GROUP PSYCHOTHERAPY: CPT

## 2023-01-23 PROCEDURE — 124N000002 HC R&B MH UMMC

## 2023-01-23 PROCEDURE — 250N000013 HC RX MED GY IP 250 OP 250 PS 637: Performed by: PSYCHIATRY & NEUROLOGY

## 2023-01-23 RX ORDER — OLANZAPINE 15 MG/1
15 TABLET ORAL AT BEDTIME
Qty: 30 TABLET | Refills: 1 | Status: SHIPPED | OUTPATIENT
Start: 2023-01-23

## 2023-01-23 RX ADMIN — HYDROXYZINE HYDROCHLORIDE 50 MG: 25 TABLET, FILM COATED ORAL at 11:07

## 2023-01-23 RX ADMIN — AZELAIC ACID: 0.15 GEL TOPICAL at 20:53

## 2023-01-23 RX ADMIN — HYDROXYZINE HYDROCHLORIDE 50 MG: 25 TABLET, FILM COATED ORAL at 17:45

## 2023-01-23 RX ADMIN — NICOTINE 1 PATCH: 21 PATCH, EXTENDED RELEASE TRANSDERMAL at 08:06

## 2023-01-23 RX ADMIN — DEXTROAMPHETAMINE SACCHARATE, AMPHETAMINE ASPARTATE, DEXTROAMPHETAMINE SULFATE AND AMPHETAMINE SULFATE 20 MG: 5; 5; 5; 5 TABLET ORAL at 14:20

## 2023-01-23 RX ADMIN — DEXTROAMPHETAMINE SACCHARATE, AMPHETAMINE ASPARTATE, DEXTROAMPHETAMINE SULFATE AND AMPHETAMINE SULFATE 20 MG: 5; 5; 5; 5 TABLET ORAL at 11:07

## 2023-01-23 RX ADMIN — LAMOTRIGINE 200 MG: 200 TABLET ORAL at 08:06

## 2023-01-23 RX ADMIN — SPIRONOLACTONE 50 MG: 50 TABLET, FILM COATED ORAL at 08:06

## 2023-01-23 RX ADMIN — OLANZAPINE 15 MG: 10 TABLET, FILM COATED ORAL at 20:52

## 2023-01-23 RX ADMIN — SPIRONOLACTONE 50 MG: 50 TABLET, FILM COATED ORAL at 17:45

## 2023-01-23 RX ADMIN — AZELAIC ACID: 0.15 GEL TOPICAL at 08:07

## 2023-01-23 RX ADMIN — QUETIAPINE 25 MG: 25 TABLET ORAL at 20:52

## 2023-01-23 ASSESSMENT — ACTIVITIES OF DAILY LIVING (ADL)
ADLS_ACUITY_SCORE: 18
ADLS_ACUITY_SCORE: 18
HYGIENE/GROOMING: INDEPENDENT
DRESS: INDEPENDENT
ADLS_ACUITY_SCORE: 18
ADLS_ACUITY_SCORE: 18
ORAL_HYGIENE: WITH ASSISTANCE
LAUNDRY: WITH SUPERVISION
ADLS_ACUITY_SCORE: 18
ADLS_ACUITY_SCORE: 18
ORAL_HYGIENE: WITH ASSISTANCE
ADLS_ACUITY_SCORE: 18
HYGIENE/GROOMING: INDEPENDENT
LAUNDRY: WITH SUPERVISION
DRESS: INDEPENDENT

## 2023-01-23 NOTE — PLAN OF CARE
01/23/23 1510   Group Therapy Session   Group Attendance attended group session   Time Session Began 1015   Time Session Ended 1155   Total Time (minutes) 45   Total # Attendees 7   Group Type expressive therapy;task skill   Group Topic Covered coping skills/lifestyle management;problem-solving;balanced lifestyle;structured socialization;leisure exploration/use of leisure time   Patient Participation Detail Pt actively participated in occupational therapy clinic to facilitate coping skill exploration, creative expression within personally meaningful activities, and clinical observation of social, cognitive, and kinesthetic performance skills. Pt response: Pt attended the second OT clinic group. Presented withdrawn. [place] was slow to initiate a task. Eventually, pt elected to work on a cognitive challenge worksheet. Once she started, pt demonstrated good focus on her task. Pleasant and calm participant.

## 2023-01-23 NOTE — PROGRESS NOTES
"[facility], [place]   Psychiatric Progress Note        Interim History:   The patient's care was discussed with the treatment team during the daily team meeting and/or staff's chart notes were reviewed.  Staff report patient is visible in the milieu and attends groups.     Psychiatric symptoms and interventions:     Patient reports that Zyprexa has been helpful. \"I am out of my psychosis bubble.\" Patient reports, \" I feel like I am here.\" Patient reports feeling some anxiety. She denies AH/VH. Patient  denies suicidal ideation.     Patient denies side effects form her medications. She has been cooperative with taking her medications.     Provider spoke with patient and mother about primary care follow up with creatinine which is slightly elevated.     Provider consulted with [facility] regarding discharge planning.          Medications:       amphetamine-dextroamphetamine  20 mg Oral BID     azelaic acid   Topical BID     lamoTRIgine  200 mg Oral Daily     nicotine  1 patch Transdermal Daily     nicotine   Transdermal Q8H     OLANZapine  15 mg Oral At Bedtime     QUEtiapine  25 mg Oral At Bedtime     spironolactone  50 mg Oral BID          Allergies:     Allergies   Allergen Reactions     No Known Allergies      abstract          Labs:   No results found for this or any previous visit (from the past 24 hour(s)).       Psychiatric Examination:     /76 (BP Location: Left arm, Patient Position: Sitting, Cuff Size: Adult Regular)   Pulse 86   Temp 97.7  F (36.5  C) (Temporal)   Resp 16   Ht 1.702 m (5' 7\")   Wt 60.2 kg (132 lb 12.8 oz)   SpO2 98%   BMI 20.80 kg/m    Weight is 132 lbs 12.8 oz  Body mass index is 20.8 kg/m .  Orthostatic Vitals     None            Appearance: awake, alert  Attitude:  cooperative  Eye Contact:  good  Mood:  anxious but feeling better. \"My personality came back.\"   Affect:  appropriate and in normal range  Speech:  clear, coherent  Psychomotor Behavior:  " no evidence of tardive dyskinesia, dystonia, or tics  Throught Process:  linear and goal oriented  Associations:  no loose associations  Thought Content:  no evidence of suicidal ideation or homicidal ideation, no auditory hallucinations present and no visual hallucinations present  Insight:  fair  Judgement:  fair  Oriented to:  time, person, and place  Attention Span and Concentration:  intact  Recent and Remote Memory:  intact    Clinical Global Impressions  First:     Most recent:            Precautions:           DIagnoses:   1.  Schizoaffective disorder.  2.  Posttraumatic stress disorder.  3.  History of attention deficit hyperactivity disorder.          Plan:     Legal status: 72 hour hold discontinued . Patient place on voluntary status.     Medication management:   Zyprexa 15 mg     Medical:  No acute issues.  Reviewed admission labs, creatinine 1.06, elevated; hCG negative.  COVID screen negative.    Disposition:   Patient will discharge to home with DBT and CM referral  On Tuesday.  Meds ordered    Provider spent 35 minutes reviewing records and labs, documentation, team meeting, talking with patient and her mother.

## 2023-01-23 NOTE — DISCHARGE INSTRUCTIONS
Behavioral Discharge Planning and Instructions    Summary: You were admitted on 1/18/2023  due to Suicidal Ideations.  You were treated by Debra Naegele APRN and discharged on 1/24/23 from [facility] to Home    Main Diagnosis:   1.  Schizoaffective disorder.  2.  Posttraumatic stress disorder.  3.  History of attention deficit hyperactivity disorder.     Health Care Follow-up:   Appointment Date/Time: Wednesday January 25, 2023 @ 9:00am  Psychiatrist: Talita Luna   Address: [facility] [address] [facility] Phone Number: [phone].    Appointment Date/Time: Tuesday January 24, 2023 @ 6:00pm  Therapist: Briana Jones Address: [facility] [address] [facility] Phone Number: [phone].  Appointment Date/Time: Tuesday January 31, 2023 @ 9:30am.  [facility] intake [facility]:  [address] [place] [address]  Phone: [phone]  This will be a 2 hour appointment.  Please bring insurance card, ID and medication list to this appointment. They will be sending some intake forms prior to the appointment via e-mail.      Other DBT Program Options:  [facility]  Address: [address] [facility]   Phone Number: [phone].  They have a 3-6 week wait for an opening and will not schedule an intake until then.     [facility]; multiple locations.  No current DBT openings.  It is noted in your records that you are interested in DBT; please call the registration line from time to time in order to be scheduled when they have openings.  [phone].      [facility] Targeted  is Jeanne Estrella [phone].  Jeanne has been contacted by [facility] and is now in the process of making a referral for a  for you.  She will reach out to you with those details once they are known.       Attend all scheduled appointments with your outpatient providers. Call at least 24 hours in advance if you  "need to reschedule an appointment to ensure continued access to your outpatient providers.     Major Treatments, Procedures and Findings:  You were provided with: a psychiatric assessment, assessed for medical stability, medication evaluation and/or management, and group therapy    Symptoms to Report: feeling more aggressive, increased confusion, losing more sleep, mood getting worse, or thoughts of suicide    Early warning signs can include: increased depression or anxiety sleep disturbances increased thoughts or behaviors of suicide or self-harm  increased unusual thinking, such as paranoia or hearing voices    Safety and Wellness:  Take all medicines as directed.  Make no changes unless your doctor suggests them.      Follow treatment recommendations.  Refrain from alcohol and non-prescribed drugs.  If there is a concern for safety, call 911.    Resources:   Crisis Intervention: 315.872.5115 or 655-552-5641 (TTY: 906.323.6958).  Call anytime for help.  [employer] [place] [employer]ness (www.mn.juli.org): 480.551.7469 or 838-925-7019.  [facility] Crisis Response 354-209-3673  Text 4 Life: txt \"LIFE\" to 25083 for immediate support and crisis intervention  Crisis text line: Text \"[place]\" to 545774. Free, confidential, 24/7.    General Medication Instructions:   See your medication sheet(s) for instructions.   Take all medicines as directed.  Make no changes unless your doctor suggests them.   Go to all your doctor visits.  Be sure to have all your required lab tests. This way, your medicines can be refilled on time.  Do not use any drugs not prescribed by your doctor.  Avoid alcohol.    Advance Directives:   Scanned document on file with [facility]? No scanned doc  Is document scanned? No. Copy Requested.  Honoring Choices Your Rights Handout: Informed and given  Was more information offered? Pt declined    The Treatment team has appreciated the opportunity to work with you. If you have any questions or concerns " about your recent admission, you can contact the unit which can receive your call 24 hours a day, 7 days a week. They will be able to get in touch with a Provider if needed. The unit number is [phone].

## 2023-01-23 NOTE — PLAN OF CARE
Problem: Psychotic Signs/Symptoms  Goal: Improved Mood Symptoms  Outcome: Adequate for Care Transition   Goal Outcome Evaluation:         Patient had flat affect. Mood was calm, anxious, and depressed. Spent most time in room resting. Was out to the milieu for meal, and to make request. Did not socialize with peers.  Did not participate in group activities. Denied pain, covid 19 symptoms, SI/HI/SIB/AH/VH, and contracted for safety. Was given Zyprexa 5 mg for agitation and scheduled medications. Had good appetite. Patient had phone conversation with mom which did not end well. Patient after the phone conversation was crying. Patient stated that her mom was mad at her. Patient requested for staff to call mom which was done but mom did not answer the phone. Staff encouraged patient and she went to bed. No further behavior issues. Will continue to monitor patient.

## 2023-01-23 NOTE — PLAN OF CARE
"  Problem: Psychotic Signs/Symptoms  Goal: Decreased Sensory Symptoms (Psychotic Signs/Symptoms)  Outcome: Adequate for Care Transition   Goal Outcome Evaluation:       Patient had pleasant affect. Mood was bright. Patient stated \"I am feeling good today. Spent most of the shift in the lounge watching TV with peers. Patient participated in OT group activities. Verbalized having anxiety 6/10 and depression 6/10. Denied pain, covid 19 symptoms, SI/HI/SIB/AH/VH, and contracted for safety. Was given Hydroxyzine 50 mg for anxiety and scheduled medications. Had had good appetite. Will continue to monitor patient.                    "

## 2023-01-23 NOTE — PLAN OF CARE
Assessment/Intervention/Current Symptoms and Care Coordination  [facility] met with patient for discharge planning.  Patient has therapy and medication management appointments in place for later this week.  Patient is requesting targeted case management referral in [facility] and would like an intake appointment for a DBT program.    [facility] contacted [facility] to make referral and learned that patient already has an assigned worker; Jeanne Estrella [phone].  This information was provided to patient, who then indicated it is a  that she is requesting.  [facility] contacted Jeanne with this request and she will be submitting a referral for housing assistance.      Discharge Plan or Goal  Patient will return home with aftercare appointments in place.  Mom to  at 10:00am on Tuesday.    Barriers to Discharge   Medication monitoring continues.     Referral Status  DBT intake has been scheduled with [facility]    Legal Status  Voluntary

## 2023-01-24 VITALS
RESPIRATION RATE: 16 BRPM | HEART RATE: 84 BPM | HEIGHT: 67 IN | SYSTOLIC BLOOD PRESSURE: 106 MMHG | OXYGEN SATURATION: 98 % | TEMPERATURE: 97.4 F | DIASTOLIC BLOOD PRESSURE: 78 MMHG | WEIGHT: 130.07 LBS | BODY MASS INDEX: 20.42 KG/M2

## 2023-01-24 LAB
CREAT SERPL-MCNC: 1.04 MG/DL (ref 0.52–1.04)
GFR SERPL CREATININE-BSD FRML MDRD: 78 ML/MIN/1.73M2

## 2023-01-24 PROCEDURE — 250N000013 HC RX MED GY IP 250 OP 250 PS 637: Performed by: CLINICAL NURSE SPECIALIST

## 2023-01-24 PROCEDURE — 36415 COLL VENOUS BLD VENIPUNCTURE: CPT | Performed by: CLINICAL NURSE SPECIALIST

## 2023-01-24 PROCEDURE — 250N000013 HC RX MED GY IP 250 OP 250 PS 637: Performed by: PSYCHIATRY & NEUROLOGY

## 2023-01-24 PROCEDURE — 82565 ASSAY OF CREATININE: CPT | Performed by: CLINICAL NURSE SPECIALIST

## 2023-01-24 PROCEDURE — 99239 HOSP IP/OBS DSCHRG MGMT >30: CPT | Performed by: CLINICAL NURSE SPECIALIST

## 2023-01-24 RX ADMIN — NICOTINE 1 PATCH: 21 PATCH, EXTENDED RELEASE TRANSDERMAL at 08:34

## 2023-01-24 RX ADMIN — LAMOTRIGINE 200 MG: 200 TABLET ORAL at 08:35

## 2023-01-24 RX ADMIN — SPIRONOLACTONE 50 MG: 50 TABLET, FILM COATED ORAL at 08:35

## 2023-01-24 RX ADMIN — AZELAIC ACID: 0.15 GEL TOPICAL at 08:35

## 2023-01-24 RX ADMIN — DEXTROAMPHETAMINE SACCHARATE, AMPHETAMINE ASPARTATE, DEXTROAMPHETAMINE SULFATE AND AMPHETAMINE SULFATE 20 MG: 5; 5; 5; 5 TABLET ORAL at 08:37

## 2023-01-24 ASSESSMENT — ACTIVITIES OF DAILY LIVING (ADL)
ADLS_ACUITY_SCORE: 18

## 2023-01-24 NOTE — PLAN OF CARE
Assessment/Intervention/Current Symptoms and Care Coordination  Patient stable for discharge.    Discharge Plan or Goal  Patient will discharge to home at 10am today with aftercare in place.    Barriers to Discharge   None    Referral Status  DBT referral completed with appointment in place.    Legal Status  Voluntary

## 2023-01-24 NOTE — PLAN OF CARE
Goal Outcome Evaluation:      DISCHARGE:  This RN and pt have reviewed all meds and aftercare plan. All belongings returned. Pt denies SI , anxiety or depression at this time. Pt bright and smiling upon DC.  Mom arrived to transport.
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
## 2023-01-24 NOTE — PLAN OF CARE
Problem: Psychotic Signs/Symptoms  Goal: Decreased Sensory Symptoms (Psychotic Signs/Symptoms)  Outcome: Adequate for Care Transition   Goal Outcome Evaluation:       Patient had flat affect. Mood was calm, anxious, and depressed. [place] [facility] [place] [facility] [place] with peers watching TV. Participated in group activities. Denied pain, covid 19 symptoms, SI/HI/SIB/AH/VH, and contracted for safety. Patient while on the phone was noted crying and she quickly went to her after the phone calls and could not explain what happened. No behavior escalations. Was given Hydroxyzine 50 mg for anxiety and scheduled medications. Had good appetite. Will continue to monitor patient.

## 2023-01-26 ENCOUNTER — PATIENT OUTREACH (OUTPATIENT)
Dept: CARE COORDINATION | Facility: CLINIC | Age: 23
End: 2023-01-26
Payer: COMMERCIAL

## 2023-01-26 NOTE — PROGRESS NOTES
[facility] Contact  [facility]/Voicemail     Clinical Data: Transitional Care Management Outreach     Outreach attempted x 2.  Left message on patient's voicemail, providing [facility]'s 24/7 scheduling and nurse triage phone number [phone] ([phone]) for questions/concerns and/or to schedule an appt with an [facility] provider, if they do not have a PCP.      Plan:  [facility] will do no further outreaches at this time.       Aline Maldonado RN  Connected Care Resource [place], [facility]    *Connected Care Resource Team does NOT follow patient ongoing. Referrals are identified based on internal discharge reports and the outreach is to ensure patient has an understanding of their discharge instructions.

## 2023-02-18 ENCOUNTER — OFFICE VISIT (OUTPATIENT)
Dept: URGENT CARE | Facility: URGENT CARE | Age: 23
End: 2023-02-18
Payer: COMMERCIAL

## 2023-02-18 VITALS
OXYGEN SATURATION: 98 % | TEMPERATURE: 98.1 F | HEART RATE: 118 BPM | DIASTOLIC BLOOD PRESSURE: 62 MMHG | SYSTOLIC BLOOD PRESSURE: 106 MMHG

## 2023-02-18 DIAGNOSIS — J02.0 STREP THROAT: ICD-10-CM

## 2023-02-18 DIAGNOSIS — J02.9 SORE THROAT: Primary | ICD-10-CM

## 2023-02-18 LAB — DEPRECATED S PYO AG THROAT QL EIA: POSITIVE

## 2023-02-18 PROCEDURE — 87880 STREP A ASSAY W/OPTIC: CPT | Performed by: FAMILY MEDICINE

## 2023-02-18 PROCEDURE — 99213 OFFICE O/P EST LOW 20 MIN: CPT | Performed by: FAMILY MEDICINE

## 2023-02-18 RX ORDER — AMOXICILLIN 875 MG
875 TABLET ORAL 2 TIMES DAILY
Qty: 20 TABLET | Refills: 0 | Status: SHIPPED | OUTPATIENT
Start: 2023-02-18 | End: 2023-02-28

## 2023-02-18 RX ORDER — FLUCONAZOLE 150 MG/1
TABLET ORAL
COMMUNITY
Start: 2023-02-16

## 2023-02-18 RX ORDER — VALACYCLOVIR HYDROCHLORIDE 1 G/1
TABLET, FILM COATED ORAL
COMMUNITY
Start: 2022-11-21

## 2023-02-18 NOTE — PROGRESS NOTES
ASSESSMENT/  PLAN:  Sore throat     - Streptococcus A Rapid Screen w/Reflex to PCR - Clinic Collect    Strep throat     - amoxicillin (AMOXIL) 875 MG tablet; Take 1 tablet (875 mg) by mouth 2 times daily for 10 days     Patient was counseled that to prevent spreading the strep infection that she should stay out of public places, work or school until she has completed 24 hours of antibiotic treatment     Symptomatic treat with gargles, lozenges, and OTC analgesic as needed. Follow-up with primary clinic if not improving.  ------------------------------------------------------------------------------------------------------------------------------------    SUBJECTIVE:  Chief Complaint   Patient presents with     Pharyngitis     Patient is a 23 yr old female who presents with sore throat symptoms that started Thursday night. Patient has a hx of strep and states she is sure it is strep again     Mali Schumacher is a 23 year old female with a chief complaint of sore throat.  Onset of symptoms was 2 day(s) ago.    Course of illness: gradual onset, still present, worsening and constant.  Severity moderate  Current and Associated symptoms: sore throat and headache  Treatment measures tried include Tylenol/Ibuprofen.  Predisposing factors include ill contact: Work.- sick children    Past Medical History:   Diagnosis Date     Anxiety      Depressive disorder      NO ACTIVE PROBLEMS      Substance abuse (H)      Patient Active Problem List   Diagnosis     Obstruction of nasolacrimal duct,      Undiagnosed cardiac murmurs     Dysphonia     Pre-nodular edema of the vocal folds     ADHD (attention deficit hyperactivity disorder), combined type     Anorexia     History of cold sores     Cognitive and behavioral changes     Mixed anxiety depressive disorder         ALLERGIES:  No known allergies    MEDs  amphetamine-dextroamphetamine (ADDERALL) 20 MG tablet, Take 20 mg by mouth 2 times daily  azelaic acid (FINACIA) 15 %  external gel, APPLY TOPICALLY TO FACE 1 TO 2 TIMES DAILY  etonogestrel (NEXPLANON) 68 MG IMPL, 1 each by Subdermal route once  fluconazole (DIFLUCAN) 150 MG tablet,   lamoTRIgine (LAMICTAL) 200 MG tablet, Take 1 tablet by mouth daily at 2 pm  LORazepam (ATIVAN) 0.5 MG tablet, TAKE 1 TABLET BY MOUTH ONCE DAILY AS NEEDED FOR SEVERE ANXIETY. QTY:30  meclizine (ANTIVERT) 12.5 MG tablet, Take 12.5 mg by mouth 3 times daily as needed for dizziness or nausea  metoclopramide (REGLAN) 5 MG tablet, Take 5 mg by mouth 2 times daily as needed  OLANZapine (ZYPREXA) 15 MG tablet, Take 1 tablet (15 mg) by mouth At Bedtime  propranolol (INDERAL) 20 MG tablet, Take 20 mg by mouth 2 times daily as needed  QUEtiapine (SEROQUEL) 25 MG tablet, Take 1 tablet by mouth At Bedtime  spironolactone (ALDACTONE) 50 MG tablet, Take 1 tablet by mouth 2 times daily  valACYclovir (VALTREX) 1000 mg tablet,     No current facility-administered medications on file prior to visit.      Social History     Tobacco Use     Smoking status: Every Day     Smokeless tobacco: Never     Tobacco comments:     pt reports using nicotine vape throughout day daily- flavored nicotine, thc vape   Substance Use Topics     Alcohol use: Yes     Comment: on weekend       Family History   Problem Relation Age of Onset     Family History Negative Other          ROS:  CONSTITUTIONAL:NEGATIVE for fever, chills   INTEGUMENTARY/SKIN: NEGATIVE for worrisome rashes,  or lesions  EYES: NEGATIVE for vision changes or irritation  RESP:NEGATIVE for significant cough or SOB    OBJECTIVE:   /62 (BP Location: Right arm, Patient Position: Sitting, Cuff Size: Adult Regular)   Pulse 118   Temp 98.1  F (36.7  C) (Tympanic)   SpO2 98%   GENERAL APPEARANCE: alert, moderate distress and cooperative  EYES: EOMI,  PERRL, conjunctiva clear  HENT: ear canals and TM's normal.  Nose normal.  Pharynx erythematous with some exudate noted.  HENT: tonsillar hypertrophy, tonsillar erythema and  tonsillar exudate  NECK: supple, non-tender to palpation, no adenopathy noted  RESP: lungs clear to auscultation - no rales, rhonchi or wheezes  CV: regular rates and rhythm, normal S1 S2, no murmur noted  SKIN: no suspicious lesions or rashes    Results for orders placed or performed in visit on 02/18/23   Streptococcus A Rapid Screen w/Reflex to PCR - Clinic Collect     Status: Abnormal    Specimen: Throat; Swab   Result Value Ref Range    Group A Strep antigen Positive (A) Negative

## 2023-07-31 NOTE — PROGRESS NOTES
Assisted patient to bathroom and back to bed using a walker. Tolerated well. Gait steady.    Patient slept all night long (over 6 hrs). No safety concerns at this time.

## 2023-09-11 ENCOUNTER — ANCILLARY PROCEDURE (OUTPATIENT)
Dept: GENERAL RADIOLOGY | Facility: CLINIC | Age: 23
End: 2023-09-11
Attending: FAMILY MEDICINE
Payer: MEDICAID

## 2023-09-11 ENCOUNTER — OFFICE VISIT (OUTPATIENT)
Dept: URGENT CARE | Facility: URGENT CARE | Age: 23
End: 2023-09-11
Payer: MEDICAID

## 2023-09-11 VITALS
OXYGEN SATURATION: 98 % | BODY MASS INDEX: 22.71 KG/M2 | DIASTOLIC BLOOD PRESSURE: 74 MMHG | SYSTOLIC BLOOD PRESSURE: 112 MMHG | TEMPERATURE: 97.2 F | WEIGHT: 145 LBS | HEART RATE: 100 BPM

## 2023-09-11 DIAGNOSIS — M25.572 ACUTE LEFT ANKLE PAIN: ICD-10-CM

## 2023-09-11 DIAGNOSIS — S99.912A ANKLE INJURY, LEFT, INITIAL ENCOUNTER: Primary | ICD-10-CM

## 2023-09-11 DIAGNOSIS — S92.352A CLOSED DISPLACED FRACTURE OF FIFTH METATARSAL BONE OF LEFT FOOT, INITIAL ENCOUNTER: ICD-10-CM

## 2023-09-11 DIAGNOSIS — S93.402A SPRAIN OF LEFT ANKLE, UNSPECIFIED LIGAMENT, INITIAL ENCOUNTER: ICD-10-CM

## 2023-09-11 PROCEDURE — 73610 X-RAY EXAM OF ANKLE: CPT | Mod: TC | Performed by: RADIOLOGY

## 2023-09-11 PROCEDURE — 99214 OFFICE O/P EST MOD 30 MIN: CPT | Performed by: FAMILY MEDICINE

## 2023-09-11 RX ORDER — DEXTROAMPHETAMINE SULFATE, DEXTROAMPHETAMINE SACCHARATE, AMPHETAMINE SULFATE AND AMPHETAMINE ASPARTATE 7.5; 7.5; 7.5; 7.5 MG/1; MG/1; MG/1; MG/1
CAPSULE, EXTENDED RELEASE ORAL
COMMUNITY
Start: 2023-08-22

## 2023-09-11 RX ORDER — HYDROXYZINE HYDROCHLORIDE 50 MG/1
TABLET, FILM COATED ORAL
COMMUNITY

## 2023-09-11 RX ORDER — HYDROXYZINE HYDROCHLORIDE 25 MG/1
TABLET, FILM COATED ORAL
COMMUNITY
Start: 2023-09-01

## 2023-09-11 NOTE — PATIENT INSTRUCTIONS
Rest , ice and elevation   Continue the boot when ambulating follow-up if pain does not get better in 7 to 10 days    Carmella Thakur MD

## 2023-09-11 NOTE — PROGRESS NOTES
Chief Complaint   Patient presents with    Urgent Care     Last night while going down steps fell and injured left ankle, and lip, ankle pain radiating up, painful to walk, swollen, pinky toe hurts   Used ice and ibuprofen        Mali was seen today for urgent care.    Diagnoses and all orders for this visit:    Ankle injury, left, initial encounter  -     XR Ankle Left G/E 3 Views    Acute left ankle pain  -     XR Ankle Left G/E 3 Views    Sprain of left ankle, unspecified ligament, initial encounter  -     Ankle/Foot Bracing Supplies DME Walking Boot; Left; Pneumatic; Short  -     Crutches Order for DME - ONLY FOR DME    Closed displaced fracture of fifth metatarsal bone of left foot, initial encounter  -     Orthopedic  Referral; Future      D/d  Ankle  strain, sprain, and contusion  Xray no ankle ankle fracture noted but left fifth metatarsal fracture noted patient was notified about the finding she will follow-up with Ortho in a week's time continue walking with the boot.  PLAN:  NSAID, ice suggested  activity modification  See orders in EpicCare.    SUBJECTIVE:  Mali Schumacher is a 23 year old female who complains of inversion injury to the left ankle 1 days ago. Immediate symptoms: immediate pain, immediate swelling, delayed swelling. It happened after a fall while she was going down stairs Symptoms have been sudden since that time. Prior history of related problems: no prior problems with this area in the past. There is pain and swelling at the lateral aspect of that ankle.     OBJECTIVE:  She appears well, vital signs are normal. There is swelling and tenderness over the lateral malleolus. No tenderness over the medial aspect of the ankle. The fifth metatarsal  was tender. The ankle joint is intact without excessive opening on stressing. X-ray: no fracture or dislocation noted The rest of the foot, ankle and leg exam is normal.    Carmella Thakur MD

## 2023-09-18 ENCOUNTER — OFFICE VISIT (OUTPATIENT)
Dept: PODIATRY | Facility: CLINIC | Age: 23
End: 2023-09-18
Attending: FAMILY MEDICINE
Payer: MEDICAID

## 2023-09-18 ENCOUNTER — ANCILLARY PROCEDURE (OUTPATIENT)
Dept: GENERAL RADIOLOGY | Facility: CLINIC | Age: 23
End: 2023-09-18
Attending: PODIATRIST
Payer: MEDICAID

## 2023-09-18 VITALS
DIASTOLIC BLOOD PRESSURE: 76 MMHG | BODY MASS INDEX: 22.76 KG/M2 | SYSTOLIC BLOOD PRESSURE: 112 MMHG | HEIGHT: 67 IN | WEIGHT: 145 LBS

## 2023-09-18 DIAGNOSIS — S92.352A CLOSED DISPLACED FRACTURE OF FIFTH METATARSAL BONE OF LEFT FOOT, INITIAL ENCOUNTER: ICD-10-CM

## 2023-09-18 PROCEDURE — 99204 OFFICE O/P NEW MOD 45 MIN: CPT | Performed by: PODIATRIST

## 2023-09-18 PROCEDURE — 73630 X-RAY EXAM OF FOOT: CPT | Mod: TC | Performed by: RADIOLOGY

## 2023-09-18 ASSESSMENT — PAIN SCALES - GENERAL: PAINLEVEL: EXTREME PAIN (8)

## 2023-09-18 NOTE — PROGRESS NOTES
"Foot & Ankle Surgery  September 18, 2023    CC: \"Fractured foot/right ankle\"    I was asked to see Mali Schumacher regarding the chief complaint by: Urgent care    HPI:  Pt is a 23 year old female who presents with above complaint.  The patient was seen in urgent care 9/11/2023 after sustaining an injury the night prior going down steps.  X-rays of the ankle showed a mildly displaced fifth metatarsal fracture.  The ankle itself was unremarkable.  She was placed into a walking boot and advised on RICE therapy.  She states the ankle swelling and pain have improved but she continues to have pain along the fifth metatarsal.  She is currently weightbearing as tolerated in the boot but pain \"gets worse and worse\".    ROS:   Pos for CC.  The patient denies current nausea, vomiting, chills, fevers, belly pain, calf pain, chest pain or SOB.  Complete remainder of ROS is otherwise neg.    VITALS:  There were no vitals filed for this visit.    PMH:    Past Medical History:   Diagnosis Date    Anxiety     Depressive disorder     NO ACTIVE PROBLEMS     Substance abuse (H)        SXHX:    Past Surgical History:   Procedure Laterality Date    NO HISTORY OF SURGERY          MEDS:    Current Outpatient Medications   Medication    ADDERALL XR 30 MG 24 hr capsule    amphetamine-dextroamphetamine (ADDERALL) 20 MG tablet    azelaic acid (FINACIA) 15 % external gel    etonogestrel (NEXPLANON) 68 MG IMPL    hydrOXYzine (ATARAX) 25 MG tablet    hydrOXYzine (ATARAX) 50 MG tablet    lamoTRIgine (LAMICTAL) 200 MG tablet    metFORMIN (GLUCOPHAGE) 1000 MG tablet    metFORMIN (GLUCOPHAGE) 500 MG tablet    metoclopramide (REGLAN) 5 MG tablet    OLANZapine (ZYPREXA) 15 MG tablet    QUEtiapine (SEROQUEL) 25 MG tablet    spironolactone (ALDACTONE) 50 MG tablet    fluconazole (DIFLUCAN) 150 MG tablet    LORazepam (ATIVAN) 0.5 MG tablet    meclizine (ANTIVERT) 12.5 MG tablet    propranolol (INDERAL) 20 MG tablet    valACYclovir (VALTREX) 1000 mg tablet "     No current facility-administered medications for this visit.       ALL:     Allergies   Allergen Reactions    No Known Allergies      abstract       FMH:    Family History   Problem Relation Age of Onset    Family History Negative Other        SocHx:    Social History     Socioeconomic History    Marital status: Single     Spouse name: Not on file    Number of children: Not on file    Years of education: Not on file    Highest education level: Not on file   Occupational History    Not on file   Tobacco Use    Smoking status: Every Day    Smokeless tobacco: Never    Tobacco comments:     pt reports using nicotine vape throughout day daily- flavored nicotine, thc vape   Substance and Sexual Activity    Alcohol use: Yes     Comment: on weekend    Drug use: Yes     Types: Marijuana, Codeine, Amphetamines     Comment: currently just THC    Sexual activity: Yes     Birth control/protection: Inserts/Ring   Other Topics Concern    Not on file   Social History Narrative    parents separation (age 3 years)  custody arrangement in place for past year - every other week currently, then with Mom school weeks and everyother weekend.     Social Determinants of Health     Financial Resource Strain: Not on file   Food Insecurity: Not on file   Transportation Needs: Not on file   Physical Activity: Not on file   Stress: Not on file   Social Connections: Not on file   Intimate Partner Violence: Not on file   Housing Stability: Not on file           EXAMINATION:  Gen:   No apparent distress  Neuro:   A&Ox3, no deficits  Psych:    Answering questions appropriately for age and situation with normal affect  Head:    NCAT  Eye:    Visual scanning without deficit  Ear:    Response to auditory stimuli wnl  Lung:    Non-labored breathing on RA noted  Abd:    NTND per patient report  Lymph:    Mild swelling lateral left forefoot  Vasc:    Pulses palpable, CFT minimally delayed  Neuro:    Light touch sensation intact to all sensory nerve  distributions without paresthesias  Derm:    Neg for nodules, lesions or ulcerations  MSK:    Mild generalized lateral left ankle pain.  Focused musculoskeletal pain at the fifth metatarsal distally  Calf:    Neg for redness, swelling or tenderness      Imagin views left foot - IMPRESSION: Healing long oblique fracture of the mid to distal fifth  metatarsal. Alignment is grossly unchanged with respect to differences  in technique. Fracture line remains visible. Normal joint spaces and  alignment.      Assessment:  23 year old female with slightly displaced left distal fifth metatarsal fracture      Medical Decision Making/Plan:  Discussed etiologies, anatomy and options  1.  Slightly displaced distal left fifth metatarsal fracture  -I personally reviewed and interpreted the patient's lower extremity history pertinent to today's visit, including imaging/labs, in preparation for initiating a treatment program.  -I personally reviewed and interpreted today's x-ray results  -Conservatively, we discussed touchdown weightbearing as tolerated in the boot +/- crutches with RICE/Tylenol as needed based on pain  -Surgically we discussed ORIF and a generic postoperative recovery  -We discussed pros, cons and risks of both approaches.  Based on displacement, I recommend surgical management but we again discussed that this will likely heal uneventfully nonsurgically, although it may take longer to return to shoes and activities nonsurgically  -Vitamin D deficiency order signed  -Our surgery scheduling handout was dispensed    Job duties; time off work -not currently working;   Smoking history -she vapes  Vit D -vitamin D deficiency order signed  Diabetic/A1c -negative  Hemoglobin -drop prior  Clot history -negative/negative  Allergies to surgical implants/suture -negative  Allergies/issues with narcotics -negative    Procedure(s):  1.  Open reduction internal fixation left fifth metatarsal fracture  Consent:  Above  Diagnosis: Left fifth metatarsal fracture  Equipment/Vendor: [employer] fifth metatarsal fracture system      Follow up: Based on surgical/nonsurgical management or sooner with acute issues      Patient's medical history was reviewed today      Darnell Lundy DPM FACFAS FACFAOM  Podiatric Foot & Ankle Surgeon  [facility]  [phone]    Disclaimer: This note consists of symbols derived from keyboarding, dictation and/or voice recognition software. As a result, there may be errors in the script that have gone undetected. Please consider this when interpreting information found in this chart.

## 2023-09-18 NOTE — LETTER
"    9/18/2023         RE: Mali Schumacher  [id number] [facility] [address]        Dear Colleague,    Thank you for referring your patient, Mali Schumacher, to the [facility]. Please see a copy of my visit note below.    Foot & Ankle Surgery  September 18, 2023    CC: \"Fractured foot/right ankle\"    I was asked to see aMli Schumacher regarding the chief complaint by: Urgent care    HPI:  Pt is a 23 year old female who presents with above complaint.  The patient was seen in urgent care 9/11/2023 after sustaining an injury the night prior going down steps.  X-rays of the ankle showed a mildly displaced fifth metatarsal fracture.  The ankle itself was unremarkable.  She was placed into a walking boot and advised on RICE therapy.  She states the ankle swelling and pain have improved but she continues to have pain along the fifth metatarsal.  She is currently weightbearing as tolerated in the boot but pain \"gets worse and worse\".    ROS:   Pos for CC.  The patient denies current nausea, vomiting, chills, fevers, belly pain, calf pain, chest pain or SOB.  Complete remainder of ROS is otherwise neg.    VITALS:  There were no vitals filed for this visit.    PMH:    Past Medical History:   Diagnosis Date     Anxiety      Depressive disorder      NO ACTIVE PROBLEMS      Substance abuse (H)        SXHX:    Past Surgical History:   Procedure Laterality Date     NO HISTORY OF SURGERY          MEDS:    Current Outpatient Medications   Medication     ADDERALL XR 30 MG 24 hr capsule     amphetamine-dextroamphetamine (ADDERALL) 20 MG tablet     azelaic acid (FINACIA) 15 % external gel     etonogestrel (NEXPLANON) 68 MG IMPL     hydrOXYzine (ATARAX) 25 MG tablet     hydrOXYzine (ATARAX) 50 MG tablet     lamoTRIgine (LAMICTAL) 200 MG tablet     metFORMIN (GLUCOPHAGE) 1000 MG tablet     metFORMIN (GLUCOPHAGE) 500 MG tablet     metoclopramide (REGLAN) 5 MG tablet     OLANZapine (ZYPREXA) 15 MG tablet     " QUEtiapine (SEROQUEL) 25 MG tablet     spironolactone (ALDACTONE) 50 MG tablet     fluconazole (DIFLUCAN) 150 MG tablet     LORazepam (ATIVAN) 0.5 MG tablet     meclizine (ANTIVERT) 12.5 MG tablet     propranolol (INDERAL) 20 MG tablet     valACYclovir (VALTREX) 1000 mg tablet     No current facility-administered medications for this visit.       ALL:     Allergies   Allergen Reactions     No Known Allergies      abstract       FMH:    Family History   Problem Relation Age of Onset     Family History Negative Other        SocHx:    Social History     Socioeconomic History     Marital status: Single     Spouse name: Not on file     Number of children: Not on file     Years of education: Not on file     Highest education level: Not on file   Occupational History     Not on file   Tobacco Use     Smoking status: Every Day     Smokeless tobacco: Never     Tobacco comments:     pt reports using nicotine vape throughout day daily- flavored nicotine, thc vape   Substance and Sexual Activity     Alcohol use: Yes     Comment: on weekend     Drug use: Yes     Types: Marijuana, Codeine, Amphetamines     Comment: currently just THC     Sexual activity: Yes     Birth control/protection: Inserts/Ring   Other Topics Concern     Not on file   Social History Narrative    parents separation (age 3 years)  custody arrangement in place for past year - every other week currently, then with Mom school weeks and everyother weekend.     Social Determinants of Health     Financial Resource Strain: Not on file   Food Insecurity: Not on file   Transportation Needs: Not on file   Physical Activity: Not on file   Stress: Not on file   Social Connections: Not on file   Intimate Partner Violence: Not on file   Housing Stability: Not on file           EXAMINATION:  Gen:   No apparent distress  Neuro:   A&Ox3, no deficits  Psych:    Answering questions appropriately for age and situation with normal affect  Head:    NCAT  Eye:    Visual scanning  without deficit  Ear:    Response to auditory stimuli wnl  Lung:    Non-labored breathing on RA noted  Abd:    NTND per patient report  Lymph:    Mild swelling lateral left forefoot  Vasc:    Pulses palpable, CFT minimally delayed  Neuro:    Light touch sensation intact to all sensory nerve distributions without paresthesias  Derm:    Neg for nodules, lesions or ulcerations  MSK:    Mild generalized lateral left ankle pain.  Focused musculoskeletal pain at the fifth metatarsal distally  Calf:    Neg for redness, swelling or tenderness      Imagin views left foot - IMPRESSION: Healing long oblique fracture of the mid to distal fifth  metatarsal. Alignment is grossly unchanged with respect to differences  in technique. Fracture line remains visible. Normal joint spaces and  alignment.      Assessment:  23 year old female with slightly displaced left distal fifth metatarsal fracture      Medical Decision Making/Plan:  Discussed etiologies, anatomy and options  1.  Slightly displaced distal left fifth metatarsal fracture  -I personally reviewed and interpreted the patient's lower extremity history pertinent to today's visit, including imaging/labs, in preparation for initiating a treatment program.  -I personally reviewed and interpreted today's x-ray results  -Conservatively, we discussed touchdown weightbearing as tolerated in the boot +/- crutches with RICE/Tylenol as needed based on pain  -Surgically we discussed ORIF and a generic postoperative recovery  -We discussed pros, cons and risks of both approaches.  Based on displacement, I recommend surgical management but we again discussed that this will likely heal uneventfully nonsurgically, although it may take longer to return to shoes and activities nonsurgically  -Vitamin D deficiency order signed  -Our surgery scheduling handout was dispensed    Job duties; time off work -not currently working;   Smoking history -she vapes  Vit D -vitamin D deficiency order  signed  Diabetic/A1c -negative  Hemoglobin -drop prior  Clot history -negative/negative  Allergies to surgical implants/suture -negative  Allergies/issues with narcotics -negative    Procedure(s):  1.  Open reduction internal fixation left fifth metatarsal fracture  Consent: Above  Diagnosis: Left fifth metatarsal fracture  Equipment/Vendor: [employer] fifth metatarsal fracture system      Follow up: Based on surgical/nonsurgical management or sooner with acute issues      Patient's medical history was reviewed today      Darnell Lundy DPM [facility]  Podiatric Foot & Ankle Surgeon  [facility]  [phone]    Disclaimer: This note consists of symbols derived from keyboarding, dictation and/or voice recognition software. As a result, there may be errors in the script that have gone undetected. Please consider this when interpreting information found in this chart.          Again, thank you for allowing me to participate in the care of your patient.        Sincerely,        Darnell Lundy DPM, LAZARA

## 2023-09-18 NOTE — PATIENT INSTRUCTIONS
Thank you for choosing [facility] Podiatry / Foot & Ankle Surgery!    DR. PAUL'S CLINIC LOCATIONS:     [facility] (Friday) TRIAGE LINE: [phone] [address] [facility]  APPOINTMENTS: [phone]   [place] [address] RADIOLOGY: [phone]    PHYSICAL THERAPY: [phone]    SET UP SURGERY: [phone]   [place] (Mon-Tues AM-Thurs) BILLING QUESTIONS: [phone]   [address] FAX: [phone]   [place] Kraft [address] [facility]: [phone]     You are seen today for assessment of the left fifth metatarsal fracture.  It would be appropriate to treat this either surgically or nonsurgically.    1.  Nonsurgical management: Weightbearing as tolerated in the walking boot +/- crutches based on symptoms.  Rest, ice, elevate and utilize Tylenol for pain.  Follow-up every 4 weeks for repeat x-rays.  Once we see sufficient healing clinically and radiographically, we will transition you back to comfortable shoes.  This could take 3 to 5 months.    2.  Surgical management: Open reduction, internal fixation means we will reduce the fracture to anatomic alignment and stabilize it with plate and screws.  For the first 2 weeks after surgery, you are elevating and resting the foot to help facilitate incision healing.  You are also nonweightbearing during this.  If sutures are removed at the 2-week appointment, you are cleared to start doing touchdown weightbearing in the boot with crutches.  If x-rays at the 6-week postop appointment show sufficient healing, you will be cleared to start increasing weight on the foot.  If x-rays at the 10-week postop appointment show sufficient healing, you will be transition back to regular shoes.    While either option is reasonable, based on displacement, I recommend surgical fixation to stabilize the fracture.  We will likely be back to shoes faster versus nonsurgical management    If you would like to proceed with surgery, see the handout below  for scheduling the procedure.    If you would like to proceed with nonsurgical management, follow-up in 4 weeks for reassessment.  Arrive 20 minutes early as we will be getting updated x-rays.  Do not hesitate to call prior with any questions.    TOE & METATARSAL FRACTURES  The structure of the foot is complex, consisting of bones, muscles, tendons, and other soft tissues. Of the 26 bones in the foot, 19 are toe bones (phalanges) and metatarsal bones (the long bones in the midfoot). Fractures of the toe and metatarsal bones are common and require evaluation by a specialist. A foot and ankle surgeon should be seen for proper diagnosis and treatment, even if initial treatment has been received in an emergency room.  A fracture is a break in the bone. Fractures can be divided into two categories: traumatic fractures and stress fractures.  TRAUMATIC FRACTURES (also called acute fractures) are caused by a direct blow or impact, such as seriously stubbing your toe. Traumatic fractures can be displaced or non-displaced. If the fracture is displaced, the bone is broken in such a way that it has changed in position (dislocated).  Signs and symptoms of a traumatic fracture include:  You may hear a sound at the time of the break.    Pinpoint pain  (pain at the place of impact) at the time the fracture occurs and perhaps for a few hours later, but often the pain goes away after several hours.   Crooked or abnormal appearance of the toe.   Bruising and swelling the next day.   It is not true that  if you can walk on it, it s not broken.  Evaluation by a foot and ankle surgeon is always recommended.   STRESS FRACTURES are tiny, hairline breaks that are usually caused by repetitive stress. Stress fractures often afflict athletes who, for example, too rapidly increase their running mileage. They can also be caused by an abnormal foot structure, deformities, or osteoporosis. Improper footwear may also lead to stress fractures.  Stress fractures should not be ignored. They require proper medical attention to heal correctly.  Symptoms of stress fractures include:  Pain with or after normal activity   Pain that goes away when resting and then returns when standing or during activity    Pinpoint pain  (pain at the site of the fracture) when touched   Swelling, but no bruising   IMPROPER TREATMENT  Some people say that  the doctor can t do anything for a broken bone in the foot.  This is usually not true. In fact, if a fractured toe or metatarsal bone is not treated correctly, serious complications may develop. For example:  A deformity in the bony architecture which may limit the ability to move the foot or cause difficulty in fitting shoes   Arthritis, which may be caused by a fracture in a joint (the juncture where two bones meet), or may be a result of angular deformities that develop when a displaced fracture is severe or hasn t been properly corrected   Chronic pain and deformity   Non-union, or failure to heal, can lead to subsequent surgery or chronic pain.   PROPER TREATMENT FOR TOES  Fractures of the toe bones are almost always traumatic fractures. Treatment for traumatic fractures depends on the break itself and may include these options:  Rest. Sometimes rest is all that is needed to treat a traumatic fracture of the toe.   Splinting. The toe may be fitted with a splint to keep it in a fixed position.   Rigid or stiff-soled shoe. Wearing a stiff-soled shoe protects the toe and helps keep it properly positioned.    Alvaro taping  the fractured toe to another toe is sometimes appropriate, but in other cases it may be harmful.   Surgery. If the break is badly displaced or if the joint is affected, surgery may be necessary. Surgery often involves the use of fixation devices, such as pins.   PROPER TREATMENT OF METATARSALS  Breaks in the metatarsal bones may be either stress or traumatic fractures. Certain kinds of fractures of the  metatarsal bones present unique challenges.  For example, sometimes a fracture of the first metatarsal bone (behind the big toe) can lead to arthritis. Since the big toe is used so frequently and bears more weight than other toes, arthritis in that area can make it painful to walk, bend, or even stand.  Another type of break, called a Becerra fracture, occurs at the base of the fifth metatarsal bone (behind the little toe). It is often misdiagnosed as an ankle sprain, and misdiagnosis can have serious consequences since sprains and fractures require different treatments. Your foot and ankle surgeon is an expert in correctly identifying these conditions as well as other problems of the foot.  Treatment of metatarsal fractures depends on the type and extent of the fracture, and may include:  Rest. Sometimes rest is the only treatment needed to promote healing of a stress or traumatic fracture of a metatarsal bone.   Avoid the offending activity. Because stress fractures result from repetitive stress, it is important to avoid the activity that led to the fracture. Crutches or a wheelchair are sometimes required to offload weight from the foot to give it time to heal.   Immobilization, casting, or rigid shoe. A stiff-soled shoe or other form of immobilization may be used to protect the fractured bone while it is healing.   Surgery. Some traumatic fractures of the metatarsal bones require surgery, especially if the break is badly displaced.   Follow-up care. Your foot and ankle surgeon will provide instructions for care following surgical or non-surgical treatment. Physical therapy, exercises and rehabilitation may be included in a schedule for return to normal activities.     Surgical planning.  If you have decided to have surgery, follow these few steps to get the procedure scheduled and to have the proper paperwork filled out.  If you are unsure about surgery, or would like to sit down and further discuss your issue and  "treatment options, please make a clinic appointment with Dr Lundy.    1.  Pick the date that you would like to have surgery.  Keep in mind that you will likely need at least 2 weeks off after the procedure for proper rest and healing.    2.  Call the surgery scheduling line at [phone] to get the procedure scheduled.  My  will assist you in getting surgery set up.      3.  Make an appointment to see Dr Lundy within 1-2 weeks of the date of surgery for your pre-operative consult.  When making the appointment, say \"I need to make a 30 minute surgical consult with Dr Lundy\".  All the paperwork will be ready for you during the visit.  It is recommended that you bring a spouse, family member or friend with you.  There will be lots of information presented.  It can be overwhelming, and it is better to have someone there to help sort out the details.    4.  Make an appointment to see your Primary Care Physician within 4 weeks of the date of surgery for your \"Pre-operative History and Physical\".  This is done to make sure you are medically healthy to undergo surgery.        If you have any post-operative questions regarding your procedure, call our triage nurses at [phone], option 3.      "

## 2023-10-16 ENCOUNTER — OFFICE VISIT (OUTPATIENT)
Dept: PODIATRY | Facility: CLINIC | Age: 23
End: 2023-10-16
Payer: COMMERCIAL

## 2023-10-16 ENCOUNTER — ANCILLARY PROCEDURE (OUTPATIENT)
Dept: GENERAL RADIOLOGY | Facility: CLINIC | Age: 23
End: 2023-10-16
Attending: PODIATRIST
Payer: COMMERCIAL

## 2023-10-16 VITALS
SYSTOLIC BLOOD PRESSURE: 137 MMHG | DIASTOLIC BLOOD PRESSURE: 85 MMHG | BODY MASS INDEX: 22.76 KG/M2 | WEIGHT: 145 LBS | HEART RATE: 96 BPM | HEIGHT: 67 IN

## 2023-10-16 DIAGNOSIS — S92.352A CLOSED DISPLACED FRACTURE OF FIFTH METATARSAL BONE OF LEFT FOOT, INITIAL ENCOUNTER: Primary | ICD-10-CM

## 2023-10-16 DIAGNOSIS — S92.352A CLOSED DISPLACED FRACTURE OF FIFTH METATARSAL BONE OF LEFT FOOT, INITIAL ENCOUNTER: ICD-10-CM

## 2023-10-16 DIAGNOSIS — S93.492D SPRAIN OF ANTERIOR TALOFIBULAR LIGAMENT OF LEFT ANKLE, SUBSEQUENT ENCOUNTER: ICD-10-CM

## 2023-10-16 PROCEDURE — 99213 OFFICE O/P EST LOW 20 MIN: CPT | Mod: 24 | Performed by: PODIATRIST

## 2023-10-16 PROCEDURE — 73630 X-RAY EXAM OF FOOT: CPT | Mod: TC | Performed by: RADIOLOGY

## 2023-10-16 NOTE — PROGRESS NOTES
"Foot & Ankle Surgery   October 16, 2023    S:  Pt is seen today for evaluation of left fifth metatarsal fracture.  This continues to be sore although pain levels are improved.  She is having worsening problems with the ankle.  Her original injury was an inversion ankle injury.    Vitals:    10/16/23 1542   BP: 137/85   Pulse: 96   Weight: 65.8 kg (145 lb)   Height: 1.702 m (5' 7\")   '      ROS - Pos for CC.  Patient denies current nausea, vomiting, chills, fevers, belly pain, calf pain, chest pain or SOB.  Complete remainder of ROS it otherwise neg.      PE:  Gen:   No apparent distress  Eye:    Visual scanning without deficit  Ear:    Response to auditory stimuli wnl  Lung:    Non-labored breathing on RA noted  Abd:    NTND per patient report  Lymph:    Neg for pitting/non-pitting edema BLE  Vasc:    Pulses palpable, CFT minimally delayed  Neuro:    Light touch sensation intact to all sensory nerve distributions without paresthesias  Derm:    Neg for nodules, lesions or ulcerations  MSK:    Left lower extremity -some tenderness on palpation along the fifth metatarsal fracture.  The main area of pain is along the ATFL at the ankle.  Calf:    Neg for redness, swelling or tenderness      Imaging:  IMPRESSION:  1.  Healing oblique fracture of the left fifth metatarsal distal  diaphysis and neck. No change in alignment. There is new sclerosis and  callus formation at the fracture margins.  2.  Normal joint spacing and alignment.      Assessment:  23 year old female with mildly displaced left fifth metatarsal fracture; previous left ankle injury with persistent/worsening ATFL pain      Medical Decision Making/Plan:  Discussed etiologies, anatomy and options  1.  Left 5th metatarsal fracture  -I personally reviewed and interpreted the x-ray results.  No change in position.  No clear evidence of healing  -Continue weightbearing as tolerated in the boot, limit weight on the foot if this causes pain along the fracture site " as this can have a negative impact on healing  -Metatarsal fracture billing performed 9/18/2023    2.  Previous inversion left ankle injury resulting in fifth metatarsal fracture with persistent/worsening pain over the ATFL  -Continue weightbearing as tolerated in the boot  -TELLO PT referral to start musculoskeletal functional rehab for ankle sprain.  We discussed that too aggressive PT may have a negative impact on fracture healing but we do not want to neglect an ankle sprain while retreating the fifth metatarsal fracture as this can lead to persistent pain or instability in the ankle.  We will start with nonweightbearing PT exercises and progress to weightbearing as tolerated    Follow up: 4 weeks for metatarsal fracture assessment and x-rays or sooner with acute issues           Darnell Lundy DPM [facility]AC[facility] FACFAOM  Podiatric Foot & Ankle Surgeon  [facility]  [phone]    Disclaimer: This note consists of symbols derived from keyboarding, dictation and/or voice recognition software. As a result, there may be errors in the script that have gone undetected. Please consider this when interpreting information found in this chart.

## 2023-10-16 NOTE — PATIENT INSTRUCTIONS
Thank you for choosing [facility] / Foot [facility] Ankle Surgery!    DR. PAUL'S CLINIC LOCATIONS:     [facility] (Friday) TRIAGE LINE: [phone]   [address] [facility]  APPOINTMENTS: [phone]   [place] [address] RADIOLOGY: [phone]    PHYSICAL THERAPY: [phone]    SET UP SURGERY: [phone]   [place] (Mon-Tues AM-Thurs) BILLING QUESTIONS: [phone]   [address] FAX: [phone]   [place] K[place] [address] [facility]: [phone]

## 2023-10-16 NOTE — LETTER
"    10/16/2023         RE: Mali Schumacher  [address] [address] [facility] [address]        Dear Colleague,    Thank you for referring your patient, Mali Schumacher, to the [facility] POD[facility]. Please see a copy of my visit note below.    Foot & Ankle Surgery   October 16, 2023    S:  Pt is seen today for evaluation of left fifth metatarsal fracture.  This continues to be sore although pain levels are improved.  She is having worsening problems with the ankle.  Her original injury was an inversion ankle injury.    Vitals:    10/16/23 1542   BP: 137/85   Pulse: 96   Weight: 65.8 kg (145 lb)   Height: 1.702 m (5' 7\")   '      ROS - Pos for CC.  Patient denies current nausea, vomiting, chills, fevers, belly pain, calf pain, chest pain or SOB.  Complete remainder of ROS it otherwise neg.      PE:  Gen:   No apparent distress  Eye:    Visual scanning without deficit  Ear:    Response to auditory stimuli wnl  Lung:    Non-labored breathing on RA noted  Abd:    NTND per patient report  Lymph:    Neg for pitting/non-pitting edema BLE  Vasc:    Pulses palpable, CFT minimally delayed  Neuro:    Light touch sensation intact to all sensory nerve distributions without paresthesias  Derm:    Neg for nodules, lesions or ulcerations  MSK:    Left lower extremity -some tenderness on palpation along the fifth metatarsal fracture.  The main area of pain is along the ATFL at the ankle.  Calf:    Neg for redness, swelling or tenderness      Imaging:  IMPRESSION:  1.  Healing oblique fracture of the left fifth metatarsal distal  diaphysis and neck. No change in alignment. There is new sclerosis and  callus formation at the fracture margins.  2.  Normal joint spacing and alignment.      Assessment:  23 year old female with mildly displaced left fifth metatarsal fracture; previous left ankle injury with persistent/worsening ATFL pain      Medical Decision Making/Plan:  Discussed etiologies, anatomy and options  1.  Left 5th " metatarsal fracture  -I personally reviewed and interpreted the x-ray results.  No change in position.  No clear evidence of healing  -Continue weightbearing as tolerated in the boot, limit weight on the foot if this causes pain along the fracture site as this can have a negative impact on healing  -Metatarsal fracture billing performed 9/18/2023    2.  Previous inversion left ankle injury resulting in fifth metatarsal fracture with persistent/worsening pain over the ATFL  -Continue weightbearing as tolerated in the boot  -TELLO PT referral to start musculoskeletal functional rehab for ankle sprain.  We discussed that too aggressive PT may have a negative impact on fracture healing but we do not want to neglect an ankle sprain while retreating the fifth metatarsal fracture as this can lead to persistent pain or instability in the ankle.  We will start with nonweightbearing PT exercises and progress to weightbearing as tolerated    Follow up: 4 weeks for metatarsal fracture assessment and x-rays or sooner with acute issues           Darnell Lundy DPM FACFAS FACFAOM  Podiatric Foot & Ankle Surgeon  [facility]  [phone]    Disclaimer: This note consists of symbols derived from keyboarding, dictation and/or voice recognition software. As a result, there may be errors in the script that have gone undetected. Please consider this when interpreting information found in this chart.        Again, thank you for allowing me to participate in the care of your patient.        Sincerely,        Darnell Lundy DPM, LAZARA

## 2023-10-17 ENCOUNTER — LAB (OUTPATIENT)
Dept: LAB | Facility: CLINIC | Age: 23
End: 2023-10-17
Payer: COMMERCIAL

## 2023-10-17 DIAGNOSIS — S92.352A CLOSED DISPLACED FRACTURE OF FIFTH METATARSAL BONE OF LEFT FOOT, INITIAL ENCOUNTER: ICD-10-CM

## 2023-10-17 PROCEDURE — 36415 COLL VENOUS BLD VENIPUNCTURE: CPT

## 2023-10-17 PROCEDURE — 82306 VITAMIN D 25 HYDROXY: CPT

## 2023-10-18 LAB — VIT D+METAB SERPL-MCNC: 30 NG/ML (ref 20–50)

## 2023-10-19 ENCOUNTER — THERAPY VISIT (OUTPATIENT)
Dept: PHYSICAL THERAPY | Facility: CLINIC | Age: 23
End: 2023-10-19
Attending: PODIATRIST
Payer: COMMERCIAL

## 2023-10-19 DIAGNOSIS — S93.492D SPRAIN OF ANTERIOR TALOFIBULAR LIGAMENT OF LEFT ANKLE, SUBSEQUENT ENCOUNTER: ICD-10-CM

## 2023-10-19 PROCEDURE — 97110 THERAPEUTIC EXERCISES: CPT | Mod: GP | Performed by: PHYSICAL THERAPIST

## 2023-10-19 PROCEDURE — 97161 PT EVAL LOW COMPLEX 20 MIN: CPT | Mod: GP | Performed by: PHYSICAL THERAPIST

## 2023-10-19 NOTE — PROGRESS NOTES
PHYSICAL THERAPY EVALUATION  Type of Visit: Evaluation    See electronic medical record for Abuse and Falls Screening details.    Subjective  Pt reports that she fell down the stairs and rolled her ankle on Early September (inversion sprain with 5th metatarsal fx). Pain is worst with walking, standing, stairs and certain positions. In the boot for 4 weeks. Denies vague symptoms. Would like to get rid of this pain and return to PLOF pain free. Wants to get back walking and being on her foot.        Presenting condition or subjective complaint: sprainued ankle, ankle pain and mobility  Date of onset: 10/19/23    Relevant medical history: Depression; Dizziness; Mental Illness   Dates & types of surgery:      Prior diagnostic imaging/testing results:       Prior therapy history for the same diagnosis, illness or injury: No      Living Environment  Social support: With family members   Type of home: House; Multi-level   Stairs to enter the home: Yes 2     Ramp: No   Stairs inside the home: Yes 13 Is there a railing: Yes   Help at home: None  Equipment owned: Crutches     Employment: No    Hobbies/Interests:      Patient goals for therapy: move my ankle properly walking    Pain assessment: Pain present  Location: lateral left ankle/achilles, left lateral foot/Ratin/10 now and at worst 7/10 overall     Objective   Gait:mild limp with boot left    Ankle/Foot Alignment: unremarkable    Screening: negative    Flexibility: gastroc/soleus    Ankle AROM/PROM (* = pain) Right Left   DF full Moderately limited*   PF full Moderately limited*   INV full Moderately limited*   EV full Moderately limited*   Great Toe EXT full full   Great Toe FL full full       Ankle Strength (* = pain) Right Left   DF 5/5 3+/5*   PF 5/5 4-/5*   INV 5/5 3+/5*   EV 5/5 3+/5*     Special Tests Right Left   Anterior Drawer (ATF) - -   Posterior Drawer (PTF) - -   Varus Stress (Calc Fib) - +   Valgus Stress (Deltoid) - -   External Rotation Stress        Palpation Tenderness:anterior/lateral left ankle tender, achilles left tender    Swelling: moderate swelling left ankle    Functional Squat/Balance: fair/poor squat, poor SLS left and good on the right    Other Tests: none    Assessment & Plan   CLINICAL IMPRESSIONS  Medical Diagnosis: Left ankle pain    Treatment Diagnosis: Left ankle pain   Impression/Assessment: Patient is a 23 year old female with left ankle/foot complaints.  The following significant findings have been identified: Pain, Decreased ROM/flexibility, Decreased joint mobility, Decreased strength, Impaired balance, Decreased proprioception, Impaired sensation, Impaired gait, Impaired muscle performance, Decreased activity tolerance, and Impaired posture. These impairments interfere with their ability to perform self care tasks, work tasks, recreational activities, household chores, driving , household mobility, and community mobility as compared to previous level of function.     Clinical Decision Making (Complexity):  Clinical Presentation: Stable/Uncomplicated  Clinical Presentation Rationale: based on medical and personal factors listed in PT evaluation  Clinical Decision Making (Complexity): Low complexity    PLAN OF CARE  Treatment Interventions:  Interventions: Gait Training, Manual Therapy, Neuromuscular Re-education, Therapeutic Activity, Therapeutic Exercise, Self-Care/Home Management    Long Term Goals     PT Goal 1  Goal Identifier: Ambulation  Goal Description: Patient will be able to walk unrestricted distances without device pain free with normalized gati pattern  Rationale: to maximize safety and independence with performance of ADLs and functional tasks;to maximize safety and independence within the home;to maximize safety and independence within the community;to maximize safety and independence with transportation;to maximize safety and independence with self cares  Goal Progress: new goal  Target Date: 12/28/23  PT Goal 2  Goal  Identifier: Squatting  Goal Description: Patient will be able to perform a full depth squat pain free without deviation pain free  Rationale: to maximize safety and independence within the community;to maximize safety and independence with transportation;to maximize safety and independence within the home;to maximize safety and independence with performance of ADLs and functional tasks;to maximize safety and independence with self cares  Goal Progress: new goal  Target Date: 12/28/23      Frequency of Treatment: 1 x week  Duration of Treatment: 10 weeks    Recommended Referrals to Other Professionals: none  Education Assessment:   Learner/Method: Patient;No Barriers to Learning  Education Comments: no concerns    Risks and benefits of evaluation/treatment have been explained.   Patient/Family/caregiver agrees with Plan of Care.     Evaluation Time:     PT Eval, Low Complexity Minutes (07546): 15     Signing Clinician: CHRISTOPHER Granda [facility]                                                                                   OUTPATIENT PHYSICAL THERAPY      PLAN OF TREATMENT FOR OUTPATIENT REHABILITATION   Patient's Last Name, First Name, MOEAlbinoKAYCEEAlbino  EndyMali  NAHEED YOB: 2000   Provider's Name   [facility]   Medical Record No.  [id number]     Onset Date: 10/19/23  Start of Care Date: 10/19/23     Medical Diagnosis:  Left ankle pain      PT Treatment Diagnosis:  Left ankle pain Plan of Treatment  Frequency/Duration: 1 x week/ 10 weeks    Certification date from 10/19/23 to 12/28/23         See note for plan of treatment details and functional goals     Alexy Wen PT                         I CERTIFY THE NEED FOR THESE SERVICES FURNISHED UNDER        THIS PLAN OF TREATMENT AND WHILE UNDER MY CARE     (Physician attestation of this document indicates review and certification of the therapy plan).                Referring Provider:  Darnell  Kamron      Initial Assessment  See Epic Evaluation- Start of Care Date: 10/19/23

## 2023-11-06 PROBLEM — S93.492D SPRAIN OF ANTERIOR TALOFIBULAR LIGAMENT OF LEFT ANKLE, SUBSEQUENT ENCOUNTER: Status: RESOLVED | Noted: 2023-10-19 | Resolved: 2023-11-06

## 2023-11-20 ENCOUNTER — OFFICE VISIT (OUTPATIENT)
Dept: PODIATRY | Facility: CLINIC | Age: 23
End: 2023-11-20
Payer: COMMERCIAL

## 2023-11-20 ENCOUNTER — ANCILLARY PROCEDURE (OUTPATIENT)
Dept: GENERAL RADIOLOGY | Facility: CLINIC | Age: 23
End: 2023-11-20
Attending: PODIATRIST
Payer: COMMERCIAL

## 2023-11-20 VITALS
SYSTOLIC BLOOD PRESSURE: 128 MMHG | WEIGHT: 145 LBS | HEART RATE: 96 BPM | HEIGHT: 67 IN | DIASTOLIC BLOOD PRESSURE: 84 MMHG | BODY MASS INDEX: 22.76 KG/M2

## 2023-11-20 DIAGNOSIS — S92.352A CLOSED DISPLACED FRACTURE OF FIFTH METATARSAL BONE OF LEFT FOOT, INITIAL ENCOUNTER: Primary | ICD-10-CM

## 2023-11-20 DIAGNOSIS — S92.352A CLOSED DISPLACED FRACTURE OF FIFTH METATARSAL BONE OF LEFT FOOT, INITIAL ENCOUNTER: ICD-10-CM

## 2023-11-20 PROCEDURE — 73630 X-RAY EXAM OF FOOT: CPT | Mod: TC | Performed by: STUDENT IN AN ORGANIZED HEALTH CARE EDUCATION/TRAINING PROGRAM

## 2023-11-20 PROCEDURE — 99213 OFFICE O/P EST LOW 20 MIN: CPT | Mod: 25 | Performed by: PODIATRIST

## 2023-11-20 NOTE — PROGRESS NOTES
"Foot & Ankle Surgery   November 20, 2023    S:  Pt is seen today for evaluation of left lower extremity pain associated with fifth metatarsal fracture and an inversion ankle injury.  She was last seen on 10/16/2023 where the fracture appears to be stable  The ankle is more problematic and she was referred to physical therapy for this.  She received a message that physical therapy was not covered and she canceled her PT sessions.  She states the foot and ankle seem to be doing well and she would like to get out of the boot soon    Vitals:    11/20/23 1624   BP: 128/84   Pulse: 96   Weight: 65.8 kg (145 lb)   Height: 1.702 m (5' 7\")   '      ROS - Pos for CC.  Patient denies current nausea, vomiting, chills, fevers, belly pain, calf pain, chest pain or SOB.  Complete remainder of ROS it otherwise neg.      PE:  Gen:   No apparent distress  Eye:    Visual scanning without deficit  Ear:    Response to auditory stimuli wnl  Lung:    Non-labored breathing on RA noted  Abd:    NTND per patient report  Lymph:    Neg for pitting/non-pitting edema BLE  Vasc:    Pulses palpable, CFT minimally delayed  Neuro:    Light touch sensation intact to all sensory nerve distributions without paresthesias  Derm:    Neg for nodules, lesions or ulcerations  MSK:    Left lower extremity -there is pain on palpation along the fifth metatarsal as well as along the anterior talofibular ligament  Calf:    Neg for redness, swelling or tenderness      Imaging:  IMPRESSION: Incompletely healing of the fracture at the distal fifth metatarsal shaft. Unchanged alignment. Fracture lucency still visible.       Assessment:  23 year old female with left fifth metatarsal fracture; previous inversion left ankle injury with resulting metatarsal fracture and ATFL sprain      Medical Decision Making/Plan:  Discussed etiologies, anatomy and options  1.  Left fifth metatarsal fracture  -I personally reviewed and interpreted today's x-ray results.  In comparison " with a 10/16/2023 films, there has been no progression of healing.  There is no displacement of the fracture fragment.  -Her vitamin D level was 30.  Encouraged her to utilize an OTC supplement, 1 or 2000 units is appropriate  -Continue weightbearing as tolerated in the boot  -RICE/Tylenol as needed based on pain  -Metatarsal fracture billing performed 9/18/2023    2.  Previous left ankle inversion injury with ATFL sprain  -I encouraged her to contact the physical therapy department to determine the origins of the phone call.  She is not sure if this was from [facility].  I have a hard time believing that physical therapy for an ankle injury is not covered by her insurance.  She will call the PT department for further clarification and hopefully resume [facility] functional rehab.  If a new PT order is necessary, I would be happy to place 1.  -Continue weightbearing as tolerated in the boot; RICE/Tylenol as needed based on pain    Follow up: 4 weeks for fifth metatarsal fracture assessment or sooner with acute issues           Darnell Lundy DPM FACFAS FACFAOM  Podiatric Foot & Ankle Surgeon  [facility]  [phone]    Disclaimer: This note consists of symbols derived from keyboarding, dictation and/or voice recognition software. As a result, there may be errors in the script that have gone undetected. Please consider this when interpreting information found in this chart.

## 2023-11-20 NOTE — LETTER
"    11/20/2023         RE: Mali Schumacher  [address] [place] [address]        Dear Colleague,    Thank you for referring your patient, Mali Schumacher, to the [facility] PODIATRY. Please see a copy of my visit note below.    Foot & Ankle Surgery   November 20, 2023    S:  Pt is seen today for evaluation of left lower extremity pain associated with fifth metatarsal fracture and an inversion ankle injury.  She was last seen on 10/16/2023 where the fracture appears to be stable  The ankle is more problematic and she was referred to physical therapy for this.  She received a message that physical therapy was not covered and she canceled her PT sessions.  She states the foot and ankle seem to be doing well and she would like to get out of the boot soon    Vitals:    11/20/23 1624   BP: 128/84   Pulse: 96   Weight: 65.8 kg (145 lb)   Height: 1.702 m (5' 7\")   '      ROS - Pos for CC.  Patient denies current nausea, vomiting, chills, fevers, belly pain, calf pain, chest pain or SOB.  Complete remainder of ROS it otherwise neg.      PE:  Gen:   No apparent distress  Eye:    Visual scanning without deficit  Ear:    Response to auditory stimuli wnl  Lung:    Non-labored breathing on RA noted  Abd:    NTND per patient report  Lymph:    Neg for pitting/non-pitting edema BLE  Vasc:    Pulses palpable, CFT minimally delayed  Neuro:    Light touch sensation intact to all sensory nerve distributions without paresthesias  Derm:    Neg for nodules, lesions or ulcerations  MSK:    Left lower extremity -there is pain on palpation along the fifth metatarsal as well as along the anterior talofibular ligament  Calf:    Neg for redness, swelling or tenderness      Imaging:  IMPRESSION: Incompletely healing of the fracture at the distal fifth metatarsal shaft. Unchanged alignment. Fracture lucency still visible.       Assessment:  23 year old female with left fifth metatarsal fracture; previous inversion left " ankle injury with resulting metatarsal fracture and ATFL sprain      Medical Decision Making/Plan:  Discussed etiologies, anatomy and options  1.  Left fifth metatarsal fracture  -I personally reviewed and interpreted today's x-ray results.  In comparison with a 10/16/2023 films, there has been no progression of healing.  There is no displacement of the fracture fragment.  -Her vitamin D level was 30.  Encouraged her to utilize an OTC supplement, 1 or 2000 units is appropriate  -Continue weightbearing as tolerated in the boot  -RICE/Tylenol as needed based on pain  -Metatarsal fracture billing performed 9/18/2023    2.  Previous left ankle inversion injury with ATFL sprain  -I encouraged her to contact the physical therapy department to determine the origins of the phone call.  She is not sure if this was from [facility].  I have a hard time believing that physical therapy for an ankle injury is not covered by her insurance.  She will call the PT department for further clarification and hopefully resume [facility] functional rehab.  If a new PT order is necessary, I would be happy to place 1.  -Continue weightbearing as tolerated in the boot; RICE/Tylenol as needed based on pain    Follow up: 4 weeks for fifth metatarsal fracture assessment or sooner with acute issues           Darnell Lundy DPM FACFAS FACFAOM  Podiatric Foot & Ankle Surgeon  [facility]  [phone]    Disclaimer: This note consists of symbols derived from keyboarding, dictation and/or voice recognition software. As a result, there may be errors in the script that have gone undetected. Please consider this when interpreting information found in this chart.        Again, thank you for allowing me to participate in the care of your patient.        Sincerely,        Darnell Lundy DPM, LAZARA

## 2023-12-18 ENCOUNTER — ANCILLARY PROCEDURE (OUTPATIENT)
Dept: GENERAL RADIOLOGY | Facility: CLINIC | Age: 23
End: 2023-12-18
Attending: PODIATRIST
Payer: COMMERCIAL

## 2023-12-18 ENCOUNTER — OFFICE VISIT (OUTPATIENT)
Dept: PODIATRY | Facility: CLINIC | Age: 23
End: 2023-12-18
Payer: COMMERCIAL

## 2023-12-18 VITALS
SYSTOLIC BLOOD PRESSURE: 134 MMHG | DIASTOLIC BLOOD PRESSURE: 78 MMHG | BODY MASS INDEX: 22.76 KG/M2 | HEIGHT: 67 IN | WEIGHT: 145 LBS

## 2023-12-18 DIAGNOSIS — S92.352A CLOSED DISPLACED FRACTURE OF FIFTH METATARSAL BONE OF LEFT FOOT, INITIAL ENCOUNTER: Primary | ICD-10-CM

## 2023-12-18 DIAGNOSIS — S92.302G DELAYED UNION OF FRACTURE OF METATARSAL BONE OF LEFT FOOT: ICD-10-CM

## 2023-12-18 DIAGNOSIS — S93.492D SPRAIN OF ANTERIOR TALOFIBULAR LIGAMENT OF LEFT ANKLE, SUBSEQUENT ENCOUNTER: ICD-10-CM

## 2023-12-18 DIAGNOSIS — S92.352A CLOSED DISPLACED FRACTURE OF FIFTH METATARSAL BONE OF LEFT FOOT, INITIAL ENCOUNTER: ICD-10-CM

## 2023-12-18 PROCEDURE — 73630 X-RAY EXAM OF FOOT: CPT | Mod: TC | Performed by: RADIOLOGY

## 2023-12-18 PROCEDURE — 99213 OFFICE O/P EST LOW 20 MIN: CPT | Performed by: PODIATRIST

## 2023-12-18 RX ORDER — LITHIUM CARBONATE 300 MG/1
TABLET, FILM COATED, EXTENDED RELEASE ORAL
COMMUNITY
Start: 2023-12-14

## 2023-12-18 NOTE — LETTER
12/18/2023         RE: Mali Schumacher  [address]  [place] [address]        Dear Colleague,    Thank you for referring your patient, Mali Schumacher, to the [facility] PODIATRY. Please see a copy of my visit note below.    Foot & Ankle Surgery   December 18, 2023    S:  Pt is seen today for evaluation of left lower extremity pain.  This involves 5th metatarsal fracture that she was initially treated for 9/18/2023.  Most recently she was seen on 11/20/2023 where x-rays failed to show progression of healing although clinically the foot was doing fine.  At her last visit, the previous ATFL sprain was the most problematic issue.  There had been a phone call that PT was not covered by her insurance and I encouraged her to follow-up with PT via phone call for further information.  She states that this is covered and she has physical therapy scheduled for January.  She is in today for follow-up on the fifth metatarsal fracture.  She is weightbearing in the boot.  She states this is not showing any further progression    There were no vitals filed for this visit.'      ROS - Pos for CC.  Patient denies current nausea, vomiting, chills, fevers, belly pain, calf pain, chest pain or SOB.  Complete remainder of ROS it otherwise neg.      PE:  Gen:   No apparent distress  Eye:    Visual scanning without deficit  Ear:    Response to auditory stimuli wnl  Lung:    Non-labored breathing on RA noted  Abd:    NTND per patient report  Left lower extremities not examined today      Imaging: 3 views weightbearing left foot - IMPRESSION: The fifth metatarsal fracture is unchanged in position and  alignment. It is slightly less visible, indicating that there may be  some early healing. However, most of the fracture is still visible and  continued follow-up radiographs may be warranted.     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  Delayed union left fifth metatarsal fracture  -I personally reviewed and interpreted today's x-ray results.  There does appear to be some deposition of bone along the fracture site, most visible on the medial oblique, the fracture line is still very readily visible and she is still clinically having pain  -Original x-rays were on 9/11/2023, most recent x-rays on 12/18/2023.  A bone stimulator was ordered for daily utilization, we will fax the order this afternoon.  The patient will follow-up with me 2 months after starting the bone stimulator for reassessment and repeat x-rays.  Continue weightbearing as tolerated, RICE/Tylenol as needed based on pain.  Continue current vitamin D supplementation.  -Fracture billing performed 9/18/2023  -We discussed that if the bone stimulator fails to show progression both radiographically and clinically, we would consider surgical intervention    Follow up: 2 months after starting bone stimulator or sooner with acute issues           Darnell Lundy DPM FACFAS FACFAOM  Podiatric Foot & Ankle Surgeon  [facility]  [phone]    Disclaimer: This note consists of symbols derived from keyboarding, dictation and/or voice recognition software. As a result, there may be errors in the script that have gone undetected. Please consider this when interpreting information found in this chart.        Again, thank you for allowing me to participate in the care of your patient.        Sincerely,        Darnell Lundy DPM, LAZARA

## 2023-12-18 NOTE — PROGRESS NOTES
Foot & Ankle Surgery   December 18, 2023    S:  Pt is seen today for evaluation of left lower extremity pain.  This involves 5th metatarsal fracture that she was initially treated for 9/18/2023.  Most recently she was seen on 11/20/2023 where x-rays failed to show progression of healing although clinically the foot was doing fine.  At her last visit, the previous ATFL sprain was the most problematic issue.  There had been a phone call that PT was not covered by her insurance and I encouraged her to follow-up with PT via phone call for further information.  She states that this is covered and she has physical therapy scheduled for January.  She is in today for follow-up on the fifth metatarsal fracture.  She is weightbearing in the boot.  She states this is not showing any further progression    There were no vitals filed for this visit.'      ROS - Pos for CC.  Patient denies current nausea, vomiting, chills, fevers, belly pain, calf pain, chest pain or SOB.  Complete remainder of ROS it otherwise neg.      PE:  Gen:   No apparent distress  Eye:    Visual scanning without deficit  Ear:    Response to auditory stimuli wnl  Lung:    Non-labored breathing on RA noted  Abd:    NTND per patient report  Left lower extremities not examined today      Imaging: 3 views weightbearing left foot - IMPRESSION: The fifth metatarsal fracture is unchanged in position and  alignment. It is slightly less visible, indicating that there may be  some early healing. However, most of the fracture is still visible and  continued follow-up radiographs may be warranted.     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  Delayed union left fifth metatarsal fracture  -I personally reviewed and interpreted today's x-ray results.  There does appear to be some deposition of bone along the fracture site, most visible on the medial  oblique, the fracture line is still very readily visible and she is still clinically having pain  -Original x-rays were on 9/11/2023, most recent x-rays on 12/18/2023.  A bone stimulator was ordered for daily utilization, we will fax the order this afternoon.  The patient will follow-up with me 2 months after starting the bone stimulator for reassessment and repeat x-rays.  Continue weightbearing as tolerated, RICE/Tylenol as needed based on pain.  Continue current vitamin D supplementation.  -Fracture billing performed 9/18/2023  -We discussed that if the bone stimulator fails to show progression both radiographically and clinically, we would consider surgical intervention    Follow up: 2 months after starting bone stimulator or sooner with acute issues           Darnell Lundy DPM FACFAS FACFAOM  Podiatric Foot & Ankle Surgeon  [facility]  [phone]    Disclaimer: This note consists of symbols derived from keyboarding, dictation and/or voice recognition software. As a result, there may be errors in the script that have gone undetected. Please consider this when interpreting information found in this chart.

## 2023-12-18 NOTE — PATIENT INSTRUCTIONS
Thank you for choosing [facility] / Foot & Ankle Surgery!    DR. PAUL'S CLINIC LOCATIONS:     [facility] (Friday) TRIAGE LINE: [phone]   [address] [facility]  APPOINTMENTS: [phone]   [place] [address] RADIOLOGY: [phone]    PHYSICAL THERAPY: [phone]    SET UP SURGERY: [phone]   [place] (Mon-Tues AM-Thurs) BILLING QUESTIONS: [phone]   [address] FAX: [phone]   [place] [address] [employer]: [phone]

## 2024-01-02 ENCOUNTER — THERAPY VISIT (OUTPATIENT)
Dept: PHYSICAL THERAPY | Facility: CLINIC | Age: 24
End: 2024-01-02
Payer: COMMERCIAL

## 2024-01-02 DIAGNOSIS — S93.492D SPRAIN OF ANTERIOR TALOFIBULAR LIGAMENT OF LEFT ANKLE, SUBSEQUENT ENCOUNTER: Primary | ICD-10-CM

## 2024-01-02 PROCEDURE — 97110 THERAPEUTIC EXERCISES: CPT | Mod: GP | Performed by: PHYSICAL THERAPIST

## 2024-01-16 ENCOUNTER — THERAPY VISIT (OUTPATIENT)
Dept: PHYSICAL THERAPY | Facility: CLINIC | Age: 24
End: 2024-01-16
Payer: COMMERCIAL

## 2024-01-16 DIAGNOSIS — S93.492D SPRAIN OF ANTERIOR TALOFIBULAR LIGAMENT OF LEFT ANKLE, SUBSEQUENT ENCOUNTER: Primary | ICD-10-CM

## 2024-01-16 PROCEDURE — 97112 NEUROMUSCULAR REEDUCATION: CPT | Mod: GP | Performed by: PHYSICAL THERAPIST

## 2024-01-16 PROCEDURE — 97110 THERAPEUTIC EXERCISES: CPT | Mod: GP | Performed by: PHYSICAL THERAPIST

## 2024-01-16 NOTE — PROGRESS NOTES
[facility]                                                                                   OUTPATIENT PHYSICAL THERAPY    PLAN OF TREATMENT FOR OUTPATIENT REHABILITATION   Patient's Last Name, First Name, Mali Carter YOB: 2000   Provider's Name   [facility]   Medical Record No.  [id number]     Onset Date: 10/19/23  Start of Care Date: 10/19/23     Medical Diagnosis:  Left ankle pain      PT Treatment Diagnosis:  Left ankle pain Plan of Treatment  Frequency/Duration: 1 x week/ 10 weeks    Certification date from 12/29/23 to 03/12/24         See note for plan of treatment details and functional goals     Alexy Wen, PT                         I CERTIFY THE NEED FOR THESE SERVICES FURNISHED UNDER        THIS PLAN OF TREATMENT AND WHILE UNDER MY CARE     (Physician attestation of this document indicates review and certification of the therapy plan).              Referring Provider:  No ref. provider found    Initial Assessment  See Epic Evaluation- Start of Care Date: 10/19/23

## 2024-01-20 ENCOUNTER — HEALTH MAINTENANCE LETTER (OUTPATIENT)
Age: 24
End: 2024-01-20

## 2024-01-23 ENCOUNTER — THERAPY VISIT (OUTPATIENT)
Dept: PHYSICAL THERAPY | Facility: CLINIC | Age: 24
End: 2024-01-23
Payer: COMMERCIAL

## 2024-01-23 DIAGNOSIS — S93.492D SPRAIN OF ANTERIOR TALOFIBULAR LIGAMENT OF LEFT ANKLE, SUBSEQUENT ENCOUNTER: Primary | ICD-10-CM

## 2024-01-23 PROCEDURE — 97110 THERAPEUTIC EXERCISES: CPT | Mod: GP | Performed by: PHYSICAL THERAPIST

## 2024-01-23 PROCEDURE — 97112 NEUROMUSCULAR REEDUCATION: CPT | Mod: GP | Performed by: PHYSICAL THERAPIST

## 2024-01-30 ENCOUNTER — THERAPY VISIT (OUTPATIENT)
Dept: PHYSICAL THERAPY | Facility: CLINIC | Age: 24
End: 2024-01-30
Payer: COMMERCIAL

## 2024-01-30 DIAGNOSIS — S93.492D SPRAIN OF ANTERIOR TALOFIBULAR LIGAMENT OF LEFT ANKLE, SUBSEQUENT ENCOUNTER: Primary | ICD-10-CM

## 2024-01-30 PROCEDURE — 97112 NEUROMUSCULAR REEDUCATION: CPT | Mod: GP | Performed by: PHYSICAL THERAPIST

## 2024-01-30 PROCEDURE — 97110 THERAPEUTIC EXERCISES: CPT | Mod: GP | Performed by: PHYSICAL THERAPIST

## 2024-02-12 ENCOUNTER — ANCILLARY PROCEDURE (OUTPATIENT)
Dept: GENERAL RADIOLOGY | Facility: CLINIC | Age: 24
End: 2024-02-12
Attending: PODIATRIST
Payer: COMMERCIAL

## 2024-02-12 ENCOUNTER — OFFICE VISIT (OUTPATIENT)
Dept: PODIATRY | Facility: CLINIC | Age: 24
End: 2024-02-12
Payer: COMMERCIAL

## 2024-02-12 VITALS — SYSTOLIC BLOOD PRESSURE: 110 MMHG | DIASTOLIC BLOOD PRESSURE: 76 MMHG | BODY MASS INDEX: 22.71 KG/M2 | WEIGHT: 145 LBS

## 2024-02-12 DIAGNOSIS — S92.302G DELAYED UNION OF FRACTURE OF METATARSAL BONE OF LEFT FOOT: ICD-10-CM

## 2024-02-12 DIAGNOSIS — S92.352A CLOSED DISPLACED FRACTURE OF FIFTH METATARSAL BONE OF LEFT FOOT, INITIAL ENCOUNTER: Primary | ICD-10-CM

## 2024-02-12 DIAGNOSIS — S92.352A CLOSED DISPLACED FRACTURE OF FIFTH METATARSAL BONE OF LEFT FOOT, INITIAL ENCOUNTER: ICD-10-CM

## 2024-02-12 PROCEDURE — 73630 X-RAY EXAM OF FOOT: CPT | Mod: TC | Performed by: RADIOLOGY

## 2024-02-12 PROCEDURE — 99213 OFFICE O/P EST LOW 20 MIN: CPT | Performed by: PODIATRIST

## 2024-02-12 NOTE — PROGRESS NOTES
Foot & Ankle Surgery   February 12, 2024    S:  Pt is seen today for evaluation of mildly displaced left fifth metatarsal fracture.  I last saw her 12/18/2023 and we ordered a bone stimulator as the fracture is not showing progress.  I advise she follow-up 2 months after starting the bone stimulator.  She received the bone stimulator January 25 and has been using it approximately 3 weeks.  Pain prior to starting was a 6 out of 10 but she states today she is essentially having no pain.  She states that physical therapy for the ankle sprain is going very well and she is doing better faster than expected    Vitals:    02/12/24 1619   BP: 110/76   Weight: 65.8 kg (145 lb)   '      ROS - Pos for CC.  Patient denies current nausea, vomiting, chills, fevers, belly pain, calf pain, chest pain or SOB.  Complete remainder of ROS it otherwise neg.      PE:  Gen:   No apparent distress  Eye:    Visual scanning without deficit  Ear:    Response to auditory stimuli wnl  Lung:    Non-labored breathing on RA noted  Abd:    NTND per patient report  Lymph:    Neg for pitting/non-pitting edema BLE  Vasc:    Pulses palpable, CFT minimally delayed  Neuro:    Light touch sensation intact to all sensory nerve distributions without paresthesias  Derm:    Neg for nodules, lesions or ulcerations  MSK:    Left foot -minimal discomfort today along the distal fifth metatarsal fracture  Calf:    Neg for redness, swelling or tenderness      Imaging:  IMPRESSION: Progressive remodeling and healing of the oblique fifth metatarsal shaft fracture with areas of peripheral bony bridging. No change in alignment. No new findings       Assessment:  24 year old female with delayed union mildly displaced left fifth metatarsal fracture      Medical Decision Making/Plan:  Discussed etiologies, anatomy and options  1.  Delayed union distal left fifth metatarsal fracture  -I personally interpreted and reviewed the x-rays.  There appears to be more deposition of  bone compared to 12/18/2023 x-rays but the fracture line is still readily visible.  -The patient is no longer having pain with weightbearing in the boot.  As she has been in the boot for many months, I encouraged her to transition back to a comfortable shoe with care to monitor symptoms at the fracture site.  If the foot becomes symptomatic, encouraged her to return to the boot to minimize healing issues  -RICE/Tylenol as needed based on pain  -Utilize the bone stimulator daily and follow-up in 2 months for reassessment and repeat x-rays.  -Fifth metatarsal fracture billing performed 9/18/2023    Follow up: 2-month or sooner with acute issues           Darnell Lundy DPM FACFAS FACFAOM  Podiatric Foot & Ankle Surgeon  [facility]  [phone]    Disclaimer: This note consists of symbols derived from keyboarding, dictation and/or voice recognition software. As a result, there may be errors in the script that have gone undetected. Please consider this when interpreting information found in this chart.

## 2024-02-12 NOTE — LETTER
2/12/2024         RE: Mali Schumacher  [address]  [place] [address]        Dear Colleague,    Thank you for referring your patient, Mali Schumacher, to the [facility] PODIATRY. Please see a copy of my visit note below.    Foot & Ankle Surgery   February 12, 2024    S:  Pt is seen today for evaluation of mildly displaced left fifth metatarsal fracture.  I last saw her 12/18/2023 and we ordered a bone stimulator as the fracture is not showing progress.  I advise she follow-up 2 months after starting the bone stimulator.  She received the bone stimulator January 25 and has been using it approximately 3 weeks.  Pain prior to starting was a 6 out of 10 but she states today she is essentially having no pain.  She states that physical therapy for the ankle sprain is going very well and she is doing better faster than expected    Vitals:    02/12/24 1619   BP: 110/76   Weight: 65.8 kg (145 lb)   '      ROS - Pos for CC.  Patient denies current nausea, vomiting, chills, fevers, belly pain, calf pain, chest pain or SOB.  Complete remainder of ROS it otherwise neg.      PE:  Gen:   No apparent distress  Eye:    Visual scanning without deficit  Ear:    Response to auditory stimuli wnl  Lung:    Non-labored breathing on RA noted  Abd:    NTND per patient report  Lymph:    Neg for pitting/non-pitting edema BLE  Vasc:    Pulses palpable, CFT minimally delayed  Neuro:    Light touch sensation intact to all sensory nerve distributions without paresthesias  Derm:    Neg for nodules, lesions or ulcerations  MSK:    Left foot -minimal discomfort today along the distal fifth metatarsal fracture  Calf:    Neg for redness, swelling or tenderness      Imaging:  IMPRESSION: Progressive remodeling and healing of the oblique fifth metatarsal shaft fracture with areas of peripheral bony bridging. No change in alignment. No new findings       Assessment:  24 year old female with delayed union mildly displaced left  fifth metatarsal fracture      Medical Decision Making/Plan:  Discussed etiologies, anatomy and options  1.  Delayed union distal left fifth metatarsal fracture  -I personally interpreted and reviewed the x-rays.  There appears to be more deposition of bone compared to 12/18/2023 x-rays but the fracture line is still readily visible.  -The patient is no longer having pain with weightbearing in the boot.  As she has been in the boot for many months, I encouraged her to transition back to a comfortable shoe with care to monitor symptoms at the fracture site.  If the foot becomes symptomatic, encouraged her to return to the boot to minimize healing issues  -RICE/Tylenol as needed based on pain  -Utilize the bone stimulator daily and follow-up in 2 months for reassessment and repeat x-rays.  -Fifth metatarsal fracture billing performed 9/18/2023    Follow up: 2-month or sooner with acute issues           Darnell Lundy DPM FACFAS FACFAOM  Podiatric Foot & Ankle Surgeon  [facility]  [phone]    Disclaimer: This note consists of symbols derived from keyboarding, dictation and/or voice recognition software. As a result, there may be errors in the script that have gone undetected. Please consider this when interpreting information found in this chart.        Again, thank you for allowing me to participate in the care of your patient.        Sincerely,        Darnell Lundy DPM, LAZARA

## 2024-02-15 ENCOUNTER — THERAPY VISIT (OUTPATIENT)
Dept: PHYSICAL THERAPY | Facility: CLINIC | Age: 24
End: 2024-02-15
Payer: COMMERCIAL

## 2024-02-15 DIAGNOSIS — S93.492D SPRAIN OF ANTERIOR TALOFIBULAR LIGAMENT OF LEFT ANKLE, SUBSEQUENT ENCOUNTER: Primary | ICD-10-CM

## 2024-02-15 PROCEDURE — 97110 THERAPEUTIC EXERCISES: CPT | Mod: GP | Performed by: PHYSICAL THERAPIST

## 2024-02-15 NOTE — PROGRESS NOTES
DISCHARGE  Reason for Discharge: Patient has met all goals.    Equipment Issued: theraband    Discharge Plan: Patient to continue home program.       02/15/24 0500   Appointment Info   Signing clinician's name / credentials Alexy Wen, PT, DPT   Total/Authorized Visits 3(+10 as of 1/2/23   Visits Used 6   Medical Diagnosis Left ankle pain   PT Tx Diagnosis Left ankle pain   Other pertinent information inversion sprain, 5th metatarsal fx   Quick Adds Certification   Progress Note/Certification   Start of Care Date 10/19/23   Onset of illness/injury or Date of Surgery 10/19/23   Therapy Frequency 1 x week   Predicted Duration 10 weeks   Certification date from 12/29/23   Certification date to 03/12/24   Progress Note Due Date 03/12/24   Progress Note Completed Date 02/15/24   GOALS   PT Goals 2   PT Goal 1   Goal Identifier Ambulation   Goal Description Patient will be able to walk unrestricted distances without device pain free with normalized gati pattern   Rationale to maximize safety and independence with performance of ADLs and functional tasks;to maximize safety and independence within the home;to maximize safety and independence within the community;to maximize safety and independence with transportation;to maximize safety and independence with self cares   Goal Progress goal met   Target Date 12/28/23   Date Met 02/15/24   PT Goal 2   Goal Identifier Squatting   Goal Description Patient will be able to perform a full depth squat pain free without deviation pain free   Rationale to maximize safety and independence within the community;to maximize safety and independence with transportation;to maximize safety and independence within the home;to maximize safety and independence with performance of ADLs and functional tasks;to maximize safety and independence with self cares   Goal Progress goal met   Target Date 12/28/23   Date Met 02/15/24   Subjective Report   Subjective Report 15 min tardy. Pt reports  that her pain is better today overall, using the bone stimulator. No boot at home without issue. Feels she can do most daily things she wants to do without issue. Feels ready to d/c at this time and will continue HEP   Objective Measures   Objective Measures Objective Measure 1;Objective Measure 2;Objective Measure 3;Objective Measure 4;Objective Measure 5   Objective Measure 1   Objective Measure Gait   Details no limp without device   Objective Measure 2   Objective Measure Ankle/foot ROM   Details DF/IV/EV all full, PF slightly limited   Objective Measure 3   Objective Measure Ankle/foot MMT   Details DF 5/5, IV 5/5, PF 5/5, EV 5-/5   Objective Measure 4   Objective Measure Palpation   Details lateral foot slight tenderness   Objective Measure 5   Objective Measure balance/function   Details good SLS and squat pain free   Treatment Interventions (PT)   Interventions Therapeutic Procedure/Exercise;Neuromuscular Re-education;Therapeutic Activity   Therapeutic Procedure/Exercise   Therapeutic Procedures: strength, endurance, ROM, flexibillity minutes (57604) 11   Therapeutic Procedures Ther Proc 2;Ther Proc 3   Ther Proc 1 Education   Ther Proc 1 - Details discussed cont HEP now that d/c   Ther Proc 2 Bike   Ther Proc 2 - Details 5'   Ther Proc 3 Leg Press   Ther Proc 3 - Details Seat 4 4pl x 20 easy, 6pl 2 x 10   PTRx Ther Proc 1 Ankle Eversion Strengthening With Theraband   PTRx Ther Proc 1 - Details HEP   PTRx Ther Proc 2 Theraband Ankle Inversion   PTRx Ther Proc 2 - Details HEP   PTRx Ther Proc 3 Toe Raises   PTRx Ther Proc 3 - Details HEP   PTRx Ther Proc 4 Reverse Toe Raises   PTRx Ther Proc 4 - Details HEP   PTRx Ther Proc 5 Standing Gastroc Stretch   PTRx Ther Proc 5 - Details x 1 min each way    PTRx Ther Proc 6 Squat With Chair   PTRx Ther Proc 6 - Details 15# x 15 good control    PTRx Ther Proc 7 Eccentric Toe Raise on Flat Surface-Gastroc   PTRx Ther Proc 7 - Details x 20L with good control    PTRx Ther  Proc 8 Stepdown Forward   PTRx Ther Proc 8 - Details progressed to 6 inch step x 20B with improved form and control    Skilled Intervention Cues for form and control throughout   Patient Response/Progress Tolerated initial HEP well   PTRx Ther Proc 9 All 4s Stretch   PTRx Ther Proc 9 - Details x 30 sec tolerates well overall    Therapeutic Activity   Ther Act 1 discussed White's law for bone healing and importance of restiance/load for healing   Skilled Intervention skilled education   Patient Response/Progress verb understanding   Neuromuscular Re-education   Neuromuscular re-ed of mvmt, balance, coord, kinesthetic sense, posture, proprioception minutes (62425) 4   Neuromuscular Re-education Neuro Re-ed 2   PTRx Neuro Re-ed 1 Side Stepping With Theraband   PTRx Neuro Re-ed 1 - Details HEP   PTRx Neuro Re-ed 2 Balance Single Leg Stance Supported and Unsupported   PTRx Neuro Re-ed 2 - Details HEP   PTRx Neuro Re-ed 3 Star Exercise   PTRx Neuro Re-ed 3 - Details x 1 min left solid ground with ease and good control    PTRx Neuro Re-ed 4 Lateral Step Down   PTRx Neuro Re-ed 4 - Details 6 inch step x 20B with cues for form and control throughout good challenge overall    Skilled Intervention cues for form   Patient Response/Progress tolerates well overall   Education   Learner/Method Patient;No Barriers to Learning   Education Comments no concerns   Plan   Home program Ptrx HEP   Updates to plan of care d/c   Plan for next session d/c to HEP   Total Session Time   Timed Code Treatment Minutes 15   Total Treatment Time (sum of timed and untimed services) 15       Referring Provider:  No ref. provider found    Inquires  Alexy Wen PT, DPT, CSCS  Physical Therapist  [facility]  [address] [facility], [address] [address] [phone]

## 2024-03-20 ENCOUNTER — VIRTUAL VISIT (OUTPATIENT)
Dept: URGENT CARE | Facility: CLINIC | Age: 24
End: 2024-03-20
Payer: COMMERCIAL

## 2024-03-20 DIAGNOSIS — B00.1 PRIMARY HERPES SIMPLEX INFECTION OF LIPS: Primary | ICD-10-CM

## 2024-03-20 PROCEDURE — 99213 OFFICE O/P EST LOW 20 MIN: CPT | Mod: 95 | Performed by: NURSE PRACTITIONER

## 2024-03-20 RX ORDER — VALACYCLOVIR HYDROCHLORIDE 1 G/1
TABLET, FILM COATED ORAL
Qty: 4 TABLET | Refills: 3 | Status: SHIPPED | OUTPATIENT
Start: 2024-03-20

## 2024-03-20 NOTE — PROGRESS NOTES
Mali is a 24 year old female who presents for a billable video visit.    ASSESSMENT/PLAN:  Diagnoses and all orders for this visit:    Primary herpes simplex infection of lips  -     valACYclovir (VALTREX) 1000 mg tablet; Take 2 tabs twice daily for one day.        Patient Instructions   Take valacyclovir as directed, start as soon as possible.   To prevent transmission, avoid sharing glasses, utensils, kissing, towels or lip balm.     For muscle tightness use cold pack 20 min every hour, or can also try warm pack.  Ibuprofen 200 mg three times daily with food every 6 - 8 hours.     Follow up with primary care provider for cold sore, and jaw.      SUBJECTIVE:  Patient reports that she feels a cold sore coming on, on her lower lip. She gets 2 - 3 per year. In the past she was given valtrex which helped.    Patient also reports that she has pain in the muscle on the left side of her jaw. She has had this previously and was given a muscle relaxer which didn't seem to help. This has occurred off and on, and usually appears in the am when she wakes up. She is scheduled for a sleep study in July.     No history of kidney or GI disease or problems.     OBJECTIVE:  Vitals not done due to this being a virtual visit    GENERAL: alert and no distress  EYES: Eyes grossly normal to inspection.  No discharge or erythema, or obvious scleral/conjunctival abnormalities.  RESP: No audible wheeze, cough, or visible cyanosis.    SKIN: Visible skin clear. No significant rash, abnormal pigmentation or lesions.  NEURO: Cranial nerves grossly intact.  Mentation and speech appropriate for age.  PSYCH: Appropriate affect, tone, and pace of words        Video-Visit Details    Type of service:  Video Visit  Video Start Time: 4:32 PM  Video End Time:4:41 PM    Originating Location (pt. Location): Home    Distant Location (provider location):  [facility]     Platform used for Video Visit: Xavi White  FNP, CNP

## 2024-03-20 NOTE — PATIENT INSTRUCTIONS
Take valacyclovir as directed, start as soon as possible.   To prevent transmission, avoid sharing glasses, utensils, kissing, towels or lip balm.     For muscle tightness use cold pack 20 min every hour, or can also try warm pack.  Ibuprofen 200 mg three times daily with food every 6 - 8 hours.     Follow up with primary care provider for cold sore, and jaw.

## 2024-06-18 ENCOUNTER — OFFICE VISIT (OUTPATIENT)
Dept: URGENT CARE | Facility: URGENT CARE | Age: 24
End: 2024-06-18
Payer: COMMERCIAL

## 2024-06-18 VITALS
HEIGHT: 67 IN | OXYGEN SATURATION: 98 % | DIASTOLIC BLOOD PRESSURE: 88 MMHG | WEIGHT: 160 LBS | SYSTOLIC BLOOD PRESSURE: 136 MMHG | TEMPERATURE: 98.2 F | RESPIRATION RATE: 18 BRPM | HEART RATE: 92 BPM | BODY MASS INDEX: 25.11 KG/M2

## 2024-06-18 DIAGNOSIS — R09.81 CONGESTION OF PARANASAL SINUS: ICD-10-CM

## 2024-06-18 DIAGNOSIS — J02.9 SORE THROAT: Primary | ICD-10-CM

## 2024-06-18 DIAGNOSIS — B96.89 BACTERIAL TONSILLITIS: ICD-10-CM

## 2024-06-18 DIAGNOSIS — J03.80 BACTERIAL TONSILLITIS: ICD-10-CM

## 2024-06-18 LAB
DEPRECATED S PYO AG THROAT QL EIA: NEGATIVE
FLUAV AG SPEC QL IA: NEGATIVE
FLUBV AG SPEC QL IA: NEGATIVE
GROUP A STREP BY PCR: DETECTED

## 2024-06-18 PROCEDURE — 87651 STREP A DNA AMP PROBE: CPT | Performed by: PHYSICIAN ASSISTANT

## 2024-06-18 PROCEDURE — 99213 OFFICE O/P EST LOW 20 MIN: CPT | Performed by: PHYSICIAN ASSISTANT

## 2024-06-18 PROCEDURE — 87804 INFLUENZA ASSAY W/OPTIC: CPT | Performed by: PHYSICIAN ASSISTANT

## 2024-06-18 RX ORDER — AMOXICILLIN 875 MG
875 TABLET ORAL 2 TIMES DAILY
Qty: 20 TABLET | Refills: 0 | Status: SHIPPED | OUTPATIENT
Start: 2024-06-18 | End: 2024-06-28

## 2024-06-18 RX ORDER — CETIRIZINE HYDROCHLORIDE, PSEUDOEPHEDRINE HYDROCHLORIDE 5; 120 MG/1; MG/1
1 TABLET, FILM COATED, EXTENDED RELEASE ORAL 2 TIMES DAILY
Qty: 20 TABLET | Refills: 0 | Status: SHIPPED | OUTPATIENT
Start: 2024-06-18

## 2024-06-18 NOTE — PROGRESS NOTES
"  Assessment & Plan     Sore throat    You had a strep test done today that was neg.  We will perform a strep culture and if any positive results come back we will call you and call in antibiotics.  At this time, this appears to be a viral infection and requires symptomatic treatment.  Most viral sore throats will resolve with in a few days.  If your throat pain worsens then please be  rechecked in the UC or by your PCP.    Strep is neg  Culture pending  Patient has a hx of strep throat and tonsillitis    OTC tylneol  Phenol for throa tpain  - Streptococcus A Rapid Screen w/Reflex to PCR - Clinic Collect  - Influenza A & B Antigen - Clinic Collect  - Group A Streptococcus PCR Throat Swab  - phenol (CHLORASEPTIC) 1.4 % spray; Take 1 spray (1 mL) by mouth every hour as needed for sore throat    Bacterial tonsillitis    This is a bacterial infection that can be spread to others. It's spread by coughing, kissing, sharing glasses or eating utensils, or by touching others after touching your mouth or nose. It's treated with antibiotic medicine. You should start to feel better in 1 to 2 days with treatment.  Strep throat is contagious for 24 hrs after starting antibiotics.     - amoxicillin (AMOXIL) 875 MG tablet; Take 1 tablet (875 mg) by mouth 2 times daily for 10 days    Congestion of paranasal sinus    Will treat symptomatically for sinus congestion, drainage  - cetirizine-pseudoePHEDrine ER (ZYRTEC-D) 5-120 MG 12 hr tablet; Take 1 tablet by mouth 2 times daily      Nicotine/Tobacco Cessation  She reports that she has been smoking vaping device. She has never used smokeless tobacco.  Nicotine/Tobacco Cessation Plan    BMI  Estimated body mass index is 25.06 kg/m  as calculated from the following:    Height as of this encounter: 1.702 m (5' 7\").    Weight as of this encounter: 72.6 kg (160 lb).       At today's visit with Mali Schumacher , we discussed results, diagnosis, medications and formulated a plan.  We also " "discussed red flags for immediate return to clinic/ER, as well as indications for follow up with PCP if not improved in 3 days. Patient understood and agreed to plan. Mali Schumacher was discharged with stable vitals and has no further questions.       No follow-ups on file.    Subjective   Mali is a 24 year old, presenting for the following health issues:  Urgent Care (Sore throat x 3 days, cough and runny nose, sinus congestion since last week)    HPI     Review of Systems  Constitutional, HEENT, cardiovascular, pulmonary, gi and gu systems are negative, except as otherwise noted.      Objective    /88   Pulse 92   Temp 98.2  F (36.8  C) (Oral)   Resp 18   Ht 1.702 m (5' 7\")   Wt 72.6 kg (160 lb)   SpO2 98%   BMI 25.06 kg/m    Body mass index is 25.06 kg/m .  Physical Exam   GENERAL: alert and no distress  EYES: Eyes grossly normal to inspection, PERRL and conjunctivae and sclerae normal  HENT: normal cephalic/atraumatic, ear canals and TM's normal, nasal mucosa edematous , tonsillar hypertrophy, and tonsillar erythema  NECK: cervical adenopathy anterior and thyroid normal to palpation  RESP: lungs clear to auscultation - no rales, rhonchi or wheezes  CV: regular rate and rhythm, normal S1 S2, no S3 or S4, no murmur, click or rub, no peripheral edema  MS: no gross musculoskeletal defects noted, no edema  SKIN: no suspicious lesions or rashes  NEURO: Normal strength and tone, mentation intact and speech normal  PSYCH: mentation appears normal, affect normal/bright    Results for orders placed or performed in visit on 06/18/24   Streptococcus A Rapid Screen w/Reflex to PCR - Clinic Collect     Status: Normal    Specimen: Throat; Swab   Result Value Ref Range    Group A Strep antigen Negative Negative   Influenza A & B Antigen - Clinic Collect     Status: Normal    Specimen: Nose; Swab   Result Value Ref Range    Influenza A antigen Negative Negative    Influenza B antigen Negative Negative    Narrative    " Test results must be correlated with clinical data. If necessary, results should be confirmed by a molecular assay or viral culture.             Signed Electronically by: ESTRELLA Mckeon, PACAROLANN

## 2024-06-19 ENCOUNTER — TELEPHONE (OUTPATIENT)
Dept: URGENT CARE | Facility: URGENT CARE | Age: 24
End: 2024-06-19
Payer: COMMERCIAL

## 2024-06-19 NOTE — TELEPHONE ENCOUNTER
----- Message from ESTRELLA Mckeon sent at 6/18/2024  6:38 PM CDT -----  Please inform patient that her strep culture is POS  She is already on antibiotics that will cover this  Contagious for 24 hrs  Throw away old toothbrushes after 48 hrs    Thank you  ESTRELLA Mckeon PA-C

## 2024-06-26 ENCOUNTER — PATIENT OUTREACH (OUTPATIENT)
Dept: CARE COORDINATION | Facility: CLINIC | Age: 24
End: 2024-06-26
Payer: COMMERCIAL

## 2024-06-26 NOTE — PROGRESS NOTES
Clinic Care Coordination Contact  Program:   County: [place]     Renewal: [facility]   Date Applied:      [facility] Outreach:   6/26/24: 1st outreach attempt. Left a message on voicemail with call back information and requested return call.  Plan: CTA will call again within 2 weeks.  Hillary Curry  Care   [facility]  Clinic Care Coordination  [phone]      Health Insurance:        Referral/Screening:

## 2024-07-10 ENCOUNTER — PATIENT OUTREACH (OUTPATIENT)
Dept: CARE COORDINATION | Facility: CLINIC | Age: 24
End: 2024-07-10
Payer: COMMERCIAL

## 2024-07-10 NOTE — PROGRESS NOTES
Clinic Care Coordination Contact  Program:   County: [place]     Renewal: [facility]   Date Applied:      [facility] Outreach:   7/10/24: 2nd outreach attempt. Left message on voicemail indicating last outreach attempt. CTA left [facility] number for renewal follow up.  Plan: CTA will no longer make outreach  Hillary Lane   [facility]  Clinic Care Coordination  [phone]    6/26/24: 1st outreach attempt. Left a message on voicemail with call back information and requested return call.  Plan: CTA will call again within 2 weeks.  Hillary Lane   [facility]  Clinic Care Coordination  [phone]      Health Insurance:        Referral/Screening:

## 2024-09-22 ENCOUNTER — OFFICE VISIT (OUTPATIENT)
Dept: URGENT CARE | Facility: URGENT CARE | Age: 24
End: 2024-09-22

## 2024-09-22 VITALS
RESPIRATION RATE: 16 BRPM | HEART RATE: 96 BPM | HEIGHT: 67 IN | OXYGEN SATURATION: 100 % | TEMPERATURE: 98.1 F | WEIGHT: 170 LBS | SYSTOLIC BLOOD PRESSURE: 134 MMHG | DIASTOLIC BLOOD PRESSURE: 78 MMHG | BODY MASS INDEX: 26.68 KG/M2

## 2024-09-22 DIAGNOSIS — J02.9 SORE THROAT: Primary | ICD-10-CM

## 2024-09-22 DIAGNOSIS — R11.2 NAUSEA AND VOMITING, UNSPECIFIED VOMITING TYPE: ICD-10-CM

## 2024-09-22 LAB — DEPRECATED S PYO AG THROAT QL EIA: NEGATIVE

## 2024-09-22 PROCEDURE — 87651 STREP A DNA AMP PROBE: CPT | Performed by: FAMILY MEDICINE

## 2024-09-22 PROCEDURE — 99213 OFFICE O/P EST LOW 20 MIN: CPT | Performed by: FAMILY MEDICINE

## 2024-09-22 RX ORDER — ONDANSETRON 4 MG/1
4 TABLET, ORALLY DISINTEGRATING ORAL EVERY 8 HOURS PRN
Qty: 12 TABLET | Refills: 0 | Status: SHIPPED | OUTPATIENT
Start: 2024-09-22

## 2024-09-22 RX ORDER — ESCITALOPRAM OXALATE 10 MG/1
10 TABLET ORAL EVERY EVENING
COMMUNITY
Start: 2024-07-29

## 2024-09-22 RX ORDER — ALUMINUM CHLORIDE 20 %
SOLUTION, NON-ORAL TOPICAL
COMMUNITY
Start: 2023-12-06

## 2024-09-22 NOTE — PROGRESS NOTES
SUBJECTIVE:   Mali Schumacher is a 24 year old female presenting with a chief complaint of cough, runny nose, sore throat, nausea/vomiting.  Had diarrhea.  Onset of symptoms was 1 week(s) ago.  Course of illness is worsening.    Severity moderate  Current and Associated symptoms: nausea  Treatment measures tried include Fluids and Rest.  Predisposing factors include None.    No fever, feels more chills and sweating  Has not obtain home rapid COVID test    Nexplonon, no menses  Has been very anxious    Has been itchy, thinks that this occurred after eating a salad and unsure if this started the GI issues too    Past Medical History:   Diagnosis Date    Anxiety     Depressive disorder     NO ACTIVE PROBLEMS     Substance abuse (H)      Current Outpatient Medications   Medication Sig Dispense Refill    ADDERALL XR 30 MG 24 hr capsule Take 1 capsule by mouth once a day*      amphetamine-dextroamphetamine (ADDERALL) 20 MG tablet Take 20 mg by mouth 2 times daily      azelaic acid (FINACIA) 15 % external gel APPLY TOPICALLY TO FACE 1 TO 2 TIMES DAILY      DRYSOL 20 % external solution APPLY TOPICALLY TO AFFECTED AREA(S) AT BEDTIME.      escitalopram (LEXAPRO) 10 MG tablet Take 10 mg by mouth every evening.      etonogestrel (NEXPLANON) 68 MG IMPL 1 each by Subdermal route once      lamoTRIgine (LAMICTAL) 200 MG tablet Take 1 tablet by mouth daily at 2 pm      OLANZapine (ZYPREXA) 15 MG tablet Take 1 tablet (15 mg) by mouth At Bedtime 30 tablet 1    valACYclovir (VALTREX) 1000 mg tablet       cetirizine-pseudoePHEDrine ER (ZYRTEC-D) 5-120 MG 12 hr tablet Take 1 tablet by mouth 2 times daily (Patient not taking: Reported on 9/22/2024) 20 tablet 0    fluconazole (DIFLUCAN) 150 MG tablet  (Patient not taking: Reported on 9/11/2023)      hydrOXYzine (ATARAX) 25 MG tablet Take 1-2 tablet by mouth twice a day as needed* (Patient not taking: Reported on 6/18/2024)      hydrOXYzine (ATARAX) 50 MG tablet TAKE 1 TABLET BY MOUTH AS  DIRECTED AS NEEDED FOR ANXIETY UP TO THREE TIMES/DAY (Patient not taking: Reported on 6/18/2024)      lithium ER (LITHOBID) 300 MG CR tablet  (Patient not taking: Reported on 6/18/2024)      LORazepam (ATIVAN) 0.5 MG tablet  (Patient not taking: Reported on 6/18/2024)      meclizine (ANTIVERT) 12.5 MG tablet Take 12.5 mg by mouth 3 times daily as needed for dizziness or nausea (Patient not taking: Reported on 9/11/2023)      metFORMIN (GLUCOPHAGE) 1000 MG tablet Take 1 tablet by mouth every night with 500mg tablet for total dose of 1500* (Patient not taking: Reported on 9/22/2024)      metFORMIN (GLUCOPHAGE) 500 MG tablet Take 1 tablet by mouth every night with 1000mg  for total dose of 1500mg* (Patient not taking: Reported on 9/22/2024)      metoclopramide (REGLAN) 5 MG tablet Take 5 mg by mouth 2 times daily as needed (Patient not taking: Reported on 6/18/2024)      phenol (CHLORASEPTIC) 1.4 % spray Take 1 spray (1 mL) by mouth every hour as needed for sore throat (Patient not taking: Reported on 9/22/2024) 236 mL 0    propranolol (INDERAL) 20 MG tablet Take 20 mg by mouth 2 times daily as needed (Patient not taking: Reported on 6/18/2024)      QUEtiapine (SEROQUEL) 25 MG tablet Take 1 tablet by mouth At Bedtime (Patient not taking: Reported on 9/22/2024)      spironolactone (ALDACTONE) 50 MG tablet Take 1 tablet by mouth 2 times daily (Patient not taking: Reported on 9/22/2024)      valACYclovir (VALTREX) 1000 mg tablet Take 2 tabs twice daily for one day. (Patient not taking: Reported on 9/22/2024) 4 tablet 3     Social History     Tobacco Use    Smoking status: Every Day     Types: Vaping Device    Smokeless tobacco: Never    Tobacco comments:     pt reports using nicotine vape throughout day daily- flavored nicotine, thc vape   Substance Use Topics    Alcohol use: Yes     Comment: on weekend       ROS:  Review of systems negative except as stated above.    OBJECTIVE:  /78   Pulse 96   Temp 98.1  F  "(36.7  C) (Oral)   Resp 16   Ht 1.702 m (5' 7\")   Wt 77.1 kg (170 lb)   SpO2 100%   PF 98 L/min   BMI 26.63 kg/m    GENERAL APPEARANCE: healthy, alert and no distress  EYES: EOMI,  PERRL, conjunctiva clear  HENT: mouth without ulcers, erythema or lesions, tonsils enlarged with no exudates, uvula midline  RESP: lungs with no audile wheezes or increase work of breathing  PSYCH: mentation appears normal and affect normal/bright    Results for orders placed or performed in visit on 09/22/24   Streptococcus A Rapid Screen w/Reflex to PCR - Clinic Collect     Status: Normal    Specimen: Throat; Swab   Result Value Ref Range    Group A Strep antigen Negative Negative         ASSESSMENT/PLAN:  (J02.9) Sore throat  (primary encounter diagnosis)  Comment: viral  Plan: Streptococcus A Rapid Screen w/Reflex to PCR -         Clinic Collect, Group A Streptococcus PCR         Throat Swab            (R11.2) Nausea and vomiting, unspecified vomiting type  Comment: viral  Plan: ondansetron (ZOFRAN ODT) 4 MG ODT tab            Reassurance given, reviewed symptomatic treatment with tylenol, ibuprofen, plenty of fluids and rest.  Will follow up on throat culture and treat if positive for strep.  Encourage BRAT diet and electrolyte fluids for diarrhea.  RX zofran given to help with nausea and vomiting symptoms.  Okay for claritin or zyrtec to minimize itchiness, avoid triggers if able to identify    Follow up with primary provider if no improvement of symptoms in 1 week    Hilton Malin MD  September 22, 2024 2:35 PM            "

## 2024-09-22 NOTE — PATIENT INSTRUCTIONS
Okay to take zofran to help with nausea and vomiting    BRAT diet - bananas, rice, applesauce and toast to help with diarrhea  Drinks lots of water or electrolyte replacement    We will contact you if the strep culture is positive    Okay to take zyrtec or claritin - 2 tablets daily for 1-2 weeks for itchiness    Avoid triggers (food is due to salad)

## 2024-09-23 LAB — GROUP A STREP BY PCR: NOT DETECTED

## 2025-01-26 ENCOUNTER — HEALTH MAINTENANCE LETTER (OUTPATIENT)
Age: 25
End: 2025-01-26